# Patient Record
Sex: MALE | Race: WHITE | NOT HISPANIC OR LATINO | Employment: FULL TIME | URBAN - METROPOLITAN AREA
[De-identification: names, ages, dates, MRNs, and addresses within clinical notes are randomized per-mention and may not be internally consistent; named-entity substitution may affect disease eponyms.]

---

## 2012-10-31 LAB
CHOLEST SERPL-MCNC: 196 MG/DL
HDLC SERPL-MCNC: 35 MG/DL
LDLC SERPL CALC-MCNC: 140 MG/DL
TRIGL SERPL-MCNC: 105 MG/DL

## 2012-11-01 LAB — HBA1C MFR BLD: 5.7 % (ref 4.2–6.1)

## 2017-01-20 ENCOUNTER — OFFICE VISIT - HEALTHEAST (OUTPATIENT)
Dept: FAMILY MEDICINE | Facility: CLINIC | Age: 52
End: 2017-01-20

## 2017-01-20 DIAGNOSIS — I25.10 CORONARY ARTERY DISEASE: ICD-10-CM

## 2017-01-20 DIAGNOSIS — E78.2 MIXED HYPERLIPIDEMIA: ICD-10-CM

## 2017-01-20 DIAGNOSIS — I10 UNSPECIFIED ESSENTIAL HYPERTENSION: ICD-10-CM

## 2017-01-20 DIAGNOSIS — H10.9 CONJUNCTIVITIS: ICD-10-CM

## 2017-01-20 LAB
CHOLEST SERPL-MCNC: 100 MG/DL
FASTING STATUS PATIENT QL REPORTED: YES
HDLC SERPL-MCNC: 33 MG/DL
LDLC SERPL CALC-MCNC: 55 MG/DL
TRIGL SERPL-MCNC: 58 MG/DL

## 2017-01-20 RX ORDER — NITROGLYCERIN 0.4 MG/1
TABLET SUBLINGUAL
Qty: 25 TABLET | Refills: 1 | Status: SHIPPED
Start: 2017-01-20 | End: 2024-07-02

## 2017-01-20 ASSESSMENT — MIFFLIN-ST. JEOR: SCORE: 1812.15

## 2017-01-25 ENCOUNTER — COMMUNICATION - HEALTHEAST (OUTPATIENT)
Dept: FAMILY MEDICINE | Facility: CLINIC | Age: 52
End: 2017-01-25

## 2017-08-02 ENCOUNTER — COMMUNICATION - HEALTHEAST (OUTPATIENT)
Dept: FAMILY MEDICINE | Facility: CLINIC | Age: 52
End: 2017-08-02

## 2017-08-02 DIAGNOSIS — I10 ESSENTIAL HYPERTENSION WITH GOAL BLOOD PRESSURE LESS THAN 140/90: ICD-10-CM

## 2017-08-18 ENCOUNTER — COMMUNICATION - HEALTHEAST (OUTPATIENT)
Dept: ADMINISTRATIVE | Facility: CLINIC | Age: 52
End: 2017-08-18

## 2017-11-09 ENCOUNTER — OFFICE VISIT - HEALTHEAST (OUTPATIENT)
Dept: FAMILY MEDICINE | Facility: CLINIC | Age: 52
End: 2017-11-09

## 2017-11-09 ENCOUNTER — COMMUNICATION - HEALTHEAST (OUTPATIENT)
Dept: TELEHEALTH | Facility: CLINIC | Age: 52
End: 2017-11-09

## 2017-11-09 ENCOUNTER — COMMUNICATION - HEALTHEAST (OUTPATIENT)
Dept: FAMILY MEDICINE | Facility: CLINIC | Age: 52
End: 2017-11-09

## 2017-11-09 DIAGNOSIS — E78.2 MIXED HYPERLIPIDEMIA: ICD-10-CM

## 2017-11-09 DIAGNOSIS — I10 ESSENTIAL HYPERTENSION: ICD-10-CM

## 2017-11-09 DIAGNOSIS — F39 MOOD DISORDER (H): ICD-10-CM

## 2017-11-09 DIAGNOSIS — L91.8 SKIN TAG: ICD-10-CM

## 2017-11-09 DIAGNOSIS — I25.810 ATHEROSCLEROSIS OF CORONARY ARTERY BYPASS GRAFT OF NATIVE HEART WITHOUT ANGINA PECTORIS: ICD-10-CM

## 2017-11-09 DIAGNOSIS — I10 ESSENTIAL HYPERTENSION WITH GOAL BLOOD PRESSURE LESS THAN 140/90: ICD-10-CM

## 2017-11-09 ASSESSMENT — MIFFLIN-ST. JEOR: SCORE: 1821.22

## 2017-11-21 ENCOUNTER — OFFICE VISIT - HEALTHEAST (OUTPATIENT)
Dept: CARDIOLOGY | Facility: CLINIC | Age: 52
End: 2017-11-21

## 2017-11-21 DIAGNOSIS — E78.2 MIXED HYPERLIPIDEMIA: ICD-10-CM

## 2017-11-21 DIAGNOSIS — I10 ESSENTIAL HYPERTENSION: ICD-10-CM

## 2017-11-21 DIAGNOSIS — I25.810 CORONARY ARTERY DISEASE INVOLVING CORONARY BYPASS GRAFT OF NATIVE HEART WITHOUT ANGINA PECTORIS: ICD-10-CM

## 2017-11-21 ASSESSMENT — MIFFLIN-ST. JEOR: SCORE: 1834.83

## 2017-12-05 ENCOUNTER — HOSPITAL ENCOUNTER (OUTPATIENT)
Dept: CARDIOLOGY | Facility: HOSPITAL | Age: 52
Discharge: HOME | End: 2017-12-05
Attending: INTERNAL MEDICINE

## 2017-12-05 DIAGNOSIS — I25.810 CORONARY ARTERY DISEASE INVOLVING CORONARY BYPASS GRAFT OF NATIVE HEART WITHOUT ANGINA PECTORIS: ICD-10-CM

## 2017-12-05 LAB
AORTIC ROOT: 3 CM
BSA FOR ECHO PROCEDURE: 2.22 M2
CV ECHO HEIGHT: 71.5 IN
CV ECHO WEIGHT: 216 LBS
DOP CALC LVOT AREA: 3.14 CM2
DOP CALC LVOT DIAMETER: 2 CM
DOP CALC LVOT PEAK VEL: 102 CM/S
DOP CALC LVOT STROKE VOLUME: 68.5 CM3
DOP CALCLVOT PEAK VEL VTI: 21.8 CM
EJECTION FRACTION: 69 % (ref 55–75)
FRACTIONAL SHORTENING: 38.2 % (ref 28–44)
INTERVENTRICULAR SEPTUM IN END DIASTOLE: 0.96 CM (ref 0.6–1)
IVS/PW RATIO: 0.8
LA AREA 1: 18.3 CM2
LA AREA 2: 19 CM2
LEFT ATRIUM LENGTH: 4.9 CM
LEFT ATRIUM SIZE: 5.7 CM
LEFT ATRIUM TO AORTIC ROOT RATIO: 1.9 NO UNITS
LEFT ATRIUM VOLUME INDEX: 27.2 ML/M2
LEFT ATRIUM VOLUME: 60.3 CM3
LEFT VENTRICLE CARDIAC INDEX: 1.9 L/MIN/M2
LEFT VENTRICLE CARDIAC OUTPUT: 4.1 L/MIN
LEFT VENTRICLE DIASTOLIC VOLUME INDEX: 48.2 CM3/M2 (ref 34–74)
LEFT VENTRICLE DIASTOLIC VOLUME: 107 CM3 (ref 62–150)
LEFT VENTRICLE HEART RATE: 60 BPM
LEFT VENTRICLE MASS INDEX: 110.4 G/M2
LEFT VENTRICLE SYSTOLIC VOLUME INDEX: 14.9 CM3/M2 (ref 11–31)
LEFT VENTRICLE SYSTOLIC VOLUME: 33 CM3 (ref 21–61)
LEFT VENTRICULAR INTERNAL DIMENSION IN DIASTOLE: 5.78 CM (ref 4.2–5.8)
LEFT VENTRICULAR INTERNAL DIMENSION IN SYSTOLE: 3.57 CM (ref 2.5–4)
LEFT VENTRICULAR MASS: 245 G
LEFT VENTRICULAR OUTFLOW TRACT MEAN GRADIENT: 2 MMHG
LEFT VENTRICULAR OUTFLOW TRACT MEAN VELOCITY: 64.1 CM/S
LEFT VENTRICULAR OUTFLOW TRACT PEAK GRADIENT: 4 MMHG
LEFT VENTRICULAR POSTERIOR WALL IN END DIASTOLE: 1.13 CM (ref 0.6–1)
LV STROKE VOLUME INDEX: 30.8 ML/M2
MITRAL VALVE E/A RATIO: 1.3
MV AVERAGE E/E' RATIO: 10.1 CM/S
MV DECELERATION TIME: 197 MS
MV E'TISSUE VEL-LAT: 11.7 CM/S
MV E'TISSUE VEL-MED: 8.22 CM/S
MV LATERAL E/E' RATIO: 8.6
MV MEDIAL E/E' RATIO: 12.3
MV PEAK A VELOCITY: 78.6 CM/S
MV PEAK E VELOCITY: 101 CM/S
NUC REST DIASTOLIC VOLUME INDEX: 3456 LBS
NUC REST SYSTOLIC VOLUME INDEX: 71.5 IN
TRICUSPID REGURGITATION PEAK PRESSURE GRADIENT: 29.8 MMHG
TRICUSPID VALVE ANULAR PLANE SYSTOLIC EXCURSION: 1.9 CM
TRICUSPID VALVE PEAK REGURGITANT VELOCITY: 273 CM/S

## 2017-12-05 ASSESSMENT — MIFFLIN-ST. JEOR: SCORE: 1834.83

## 2018-01-24 ENCOUNTER — COMMUNICATION - HEALTHEAST (OUTPATIENT)
Dept: FAMILY MEDICINE | Facility: CLINIC | Age: 53
End: 2018-01-24

## 2018-01-24 DIAGNOSIS — E78.2 MIXED HYPERLIPIDEMIA: ICD-10-CM

## 2018-08-06 ENCOUNTER — COMMUNICATION - HEALTHEAST (OUTPATIENT)
Dept: FAMILY MEDICINE | Facility: CLINIC | Age: 53
End: 2018-08-06

## 2018-08-06 DIAGNOSIS — I10 ESSENTIAL HYPERTENSION WITH GOAL BLOOD PRESSURE LESS THAN 140/90: ICD-10-CM

## 2018-11-06 ENCOUNTER — COMMUNICATION - HEALTHEAST (OUTPATIENT)
Dept: FAMILY MEDICINE | Facility: CLINIC | Age: 53
End: 2018-11-06

## 2018-11-06 DIAGNOSIS — E78.2 MIXED HYPERLIPIDEMIA: ICD-10-CM

## 2018-11-06 DIAGNOSIS — I10 ESSENTIAL HYPERTENSION WITH GOAL BLOOD PRESSURE LESS THAN 140/90: ICD-10-CM

## 2018-11-16 ENCOUNTER — OFFICE VISIT - HEALTHEAST (OUTPATIENT)
Dept: CARDIOLOGY | Facility: CLINIC | Age: 53
End: 2018-11-16

## 2018-11-16 DIAGNOSIS — E78.2 MIXED HYPERLIPIDEMIA: ICD-10-CM

## 2018-11-16 DIAGNOSIS — I10 ESSENTIAL HYPERTENSION: ICD-10-CM

## 2018-11-16 DIAGNOSIS — I25.810 CORONARY ARTERY DISEASE INVOLVING CORONARY BYPASS GRAFT OF NATIVE HEART WITHOUT ANGINA PECTORIS: ICD-10-CM

## 2018-11-16 ASSESSMENT — MIFFLIN-ST. JEOR: SCORE: 1839.37

## 2019-01-28 ENCOUNTER — COMMUNICATION - HEALTHEAST (OUTPATIENT)
Dept: FAMILY MEDICINE | Facility: CLINIC | Age: 54
End: 2019-01-28

## 2019-01-28 DIAGNOSIS — E78.2 MIXED HYPERLIPIDEMIA: ICD-10-CM

## 2019-01-28 DIAGNOSIS — I10 ESSENTIAL HYPERTENSION WITH GOAL BLOOD PRESSURE LESS THAN 140/90: ICD-10-CM

## 2019-03-29 ENCOUNTER — OFFICE VISIT - HEALTHEAST (OUTPATIENT)
Dept: FAMILY MEDICINE | Facility: CLINIC | Age: 54
End: 2019-03-29

## 2019-03-29 DIAGNOSIS — Z00.00 ROUTINE GENERAL MEDICAL EXAMINATION AT A HEALTH CARE FACILITY: ICD-10-CM

## 2019-03-29 DIAGNOSIS — M79.671 BILATERAL FOOT PAIN: ICD-10-CM

## 2019-03-29 DIAGNOSIS — M79.672 BILATERAL FOOT PAIN: ICD-10-CM

## 2019-03-29 DIAGNOSIS — I10 ESSENTIAL HYPERTENSION: ICD-10-CM

## 2019-03-29 DIAGNOSIS — D12.2 ADENOMATOUS POLYP OF ASCENDING COLON: ICD-10-CM

## 2019-03-29 DIAGNOSIS — I25.810 ATHEROSCLEROSIS OF CORONARY ARTERY BYPASS GRAFT OF NATIVE HEART WITHOUT ANGINA PECTORIS: ICD-10-CM

## 2019-03-29 DIAGNOSIS — G89.29 CHRONIC LEFT SHOULDER PAIN: ICD-10-CM

## 2019-03-29 DIAGNOSIS — R73.01 IMPAIRED FASTING GLUCOSE: ICD-10-CM

## 2019-03-29 DIAGNOSIS — E78.2 MIXED HYPERLIPIDEMIA: ICD-10-CM

## 2019-03-29 DIAGNOSIS — M25.512 CHRONIC LEFT SHOULDER PAIN: ICD-10-CM

## 2019-03-29 LAB
ALBUMIN SERPL-MCNC: 4.1 G/DL (ref 3.5–5)
ALP SERPL-CCNC: 36 U/L (ref 45–120)
ALT SERPL W P-5'-P-CCNC: 26 U/L (ref 0–45)
ANION GAP SERPL CALCULATED.3IONS-SCNC: 9 MMOL/L (ref 5–18)
AST SERPL W P-5'-P-CCNC: 22 U/L (ref 0–40)
BILIRUB DIRECT SERPL-MCNC: 0.2 MG/DL
BILIRUB SERPL-MCNC: 0.4 MG/DL (ref 0–1)
BUN SERPL-MCNC: 8 MG/DL (ref 8–22)
CALCIUM SERPL-MCNC: 9.5 MG/DL (ref 8.5–10.5)
CHLORIDE BLD-SCNC: 105 MMOL/L (ref 98–107)
CHOLEST SERPL-MCNC: 112 MG/DL
CO2 SERPL-SCNC: 29 MMOL/L (ref 22–31)
CREAT SERPL-MCNC: 0.79 MG/DL (ref 0.7–1.3)
ERYTHROCYTE [DISTWIDTH] IN BLOOD BY AUTOMATED COUNT: 12.9 % (ref 11–14.5)
FASTING STATUS PATIENT QL REPORTED: YES
GFR SERPL CREATININE-BSD FRML MDRD: >60 ML/MIN/1.73M2
GLUCOSE BLD-MCNC: 103 MG/DL (ref 70–125)
HCT VFR BLD AUTO: 41.3 % (ref 40–54)
HDLC SERPL-MCNC: 42 MG/DL
HGB BLD-MCNC: 14.2 G/DL (ref 14–18)
LDLC SERPL CALC-MCNC: 59 MG/DL
MCH RBC QN AUTO: 30.4 PG (ref 27–34)
MCHC RBC AUTO-ENTMCNC: 34.4 G/DL (ref 32–36)
MCV RBC AUTO: 88 FL (ref 80–100)
PLATELET # BLD AUTO: 169 THOU/UL (ref 140–440)
PMV BLD AUTO: 7.7 FL (ref 7–10)
POTASSIUM BLD-SCNC: 4.1 MMOL/L (ref 3.5–5)
PROT SERPL-MCNC: 6.8 G/DL (ref 6–8)
PSA SERPL-MCNC: 0.4 NG/ML (ref 0–3.5)
RBC # BLD AUTO: 4.67 MILL/UL (ref 4.4–6.2)
SODIUM SERPL-SCNC: 143 MMOL/L (ref 136–145)
TRIGL SERPL-MCNC: 57 MG/DL
WBC: 4.7 THOU/UL (ref 4–11)

## 2019-03-29 ASSESSMENT — MIFFLIN-ST. JEOR: SCORE: 1822.48

## 2019-05-06 ENCOUNTER — COMMUNICATION - HEALTHEAST (OUTPATIENT)
Dept: FAMILY MEDICINE | Facility: CLINIC | Age: 54
End: 2019-05-06

## 2019-05-06 DIAGNOSIS — I10 ESSENTIAL HYPERTENSION WITH GOAL BLOOD PRESSURE LESS THAN 140/90: ICD-10-CM

## 2019-07-10 ENCOUNTER — COMMUNICATION - HEALTHEAST (OUTPATIENT)
Dept: FAMILY MEDICINE | Facility: CLINIC | Age: 54
End: 2019-07-10

## 2019-08-10 ENCOUNTER — COMMUNICATION - HEALTHEAST (OUTPATIENT)
Dept: FAMILY MEDICINE | Facility: CLINIC | Age: 54
End: 2019-08-10

## 2019-08-10 DIAGNOSIS — E78.2 MIXED HYPERLIPIDEMIA: ICD-10-CM

## 2019-11-12 ENCOUNTER — COMMUNICATION - HEALTHEAST (OUTPATIENT)
Dept: FAMILY MEDICINE | Facility: CLINIC | Age: 54
End: 2019-11-12

## 2019-11-12 DIAGNOSIS — E78.2 MIXED HYPERLIPIDEMIA: ICD-10-CM

## 2020-02-09 ENCOUNTER — COMMUNICATION - HEALTHEAST (OUTPATIENT)
Dept: FAMILY MEDICINE | Facility: CLINIC | Age: 55
End: 2020-02-09

## 2020-02-09 DIAGNOSIS — E78.2 MIXED HYPERLIPIDEMIA: ICD-10-CM

## 2020-03-13 ENCOUNTER — OFFICE VISIT - HEALTHEAST (OUTPATIENT)
Dept: FAMILY MEDICINE | Facility: CLINIC | Age: 55
End: 2020-03-13

## 2020-03-13 DIAGNOSIS — L03.116 CELLULITIS OF LEFT LOWER EXTREMITY: ICD-10-CM

## 2020-05-09 ENCOUNTER — COMMUNICATION - HEALTHEAST (OUTPATIENT)
Dept: FAMILY MEDICINE | Facility: CLINIC | Age: 55
End: 2020-05-09

## 2020-05-09 DIAGNOSIS — E78.2 MIXED HYPERLIPIDEMIA: ICD-10-CM

## 2020-05-15 ENCOUNTER — COMMUNICATION - HEALTHEAST (OUTPATIENT)
Dept: FAMILY MEDICINE | Facility: CLINIC | Age: 55
End: 2020-05-15

## 2020-05-15 DIAGNOSIS — I10 ESSENTIAL HYPERTENSION WITH GOAL BLOOD PRESSURE LESS THAN 140/90: ICD-10-CM

## 2020-09-15 ENCOUNTER — COMMUNICATION - HEALTHEAST (OUTPATIENT)
Dept: CARDIOLOGY | Facility: CLINIC | Age: 55
End: 2020-09-15

## 2020-09-16 ENCOUNTER — OFFICE VISIT - HEALTHEAST (OUTPATIENT)
Dept: CARDIOLOGY | Facility: CLINIC | Age: 55
End: 2020-09-16

## 2020-09-16 DIAGNOSIS — I25.810 ATHEROSCLEROSIS OF CORONARY ARTERY BYPASS GRAFT OF NATIVE HEART WITHOUT ANGINA PECTORIS: ICD-10-CM

## 2020-09-16 DIAGNOSIS — I10 ESSENTIAL HYPERTENSION: ICD-10-CM

## 2020-09-16 DIAGNOSIS — E78.2 MIXED HYPERLIPIDEMIA: ICD-10-CM

## 2020-09-16 ASSESSMENT — MIFFLIN-ST. JEOR: SCORE: 1773.03

## 2021-05-27 NOTE — PATIENT INSTRUCTIONS - HE
Increase physical activity. Your goal is 30 minutes most days  Consider the Shingrix vaccine for shingles.  Check with insurance regarding coverage  Your colonoscopy will be due next year in March 2020  We will check laboratory testing including a prostate cancer screening test  Review the stretches and exercises for your shoulder discomfort  If you have ongoing shoulder pain then I recommend follow-up with orthopedics  Make sure that you have appropriate foot wear  You can follow-up with podiatry if having ongoing issues  You may call for the referrals if needed

## 2021-05-27 NOTE — PROGRESS NOTES
Assessment/ Plan     1. Routine general medical examination at a health care facility    Recommend that he increase physical activity.  His goal is 30 minutes of aerobic exercise most days  Check a PSA.  Discussed the caveats of prostate cancer screening  - PSA (Prostatic-Specific Antigen), Annual Screen    Recommend he consider the Shingrix/shingles vaccine    2. Atherosclerosis of coronary artery bypass graft of native heart without angina pectoris  Status post CABG x5    Continue to follow plan of Dr. Ta, cardiology  Check a lipid cascade, hepatic profile, basic metabolic panel  Recommend increasing aerobic exercise as tolerated  Continue aspirin, atorvastatin, metoprolol  - HM2(CBC w/o Differential)    3. Hypertension, currently stable    Continue metoprolol  Check a basic metabolic panel  - Basic Metabolic Panel    4. Mixed hyperlipidemia    Continue atorvastatin  Check lipid cascade and hepatic profile  - Hepatic Profile  - Lipid Cascade    5. Impaired fasting glucose    Recommend limiting carbohydrates in his diet  Check a glucose level    6. Chronic left shoulder pain  May be secondary to a mild rotator cuff tendinitis    Reviewed stretches and exercises  Patient may take anti-inflammatory medication as tolerated  If not improving then favor follow-up with orthopedics    7. Bilateral foot pain  He appears to have tenderness of the mid fifth metatarsals bilaterally at the tendon insertion point    Recommend that he make sure that his shoes have appropriate with for the toe box is  If not improving then favor follow-up with orthopedics    8.  Adenomatous colon polyp    Reviewed his colonoscopy from March of 2015.  He is due in March 2020      Subjective:       Toney Bro is a 53 y.o. male who presents to the clinic for a complete physical examination.  He is overdue for a visit and his last laboratory testing was in 2017.    As noted, he has a history of coronary artery disease, hypertension,  hyperlipidemia, and a mood disorder.  He has had previous 5 vessel bypass surgery in 2012 and continues to follow-up with cardiology, Dr. Ta.  He has been stable from a cardiovascular perspective and continues to take aspirin, atorvastatin, and metoprolol.  He tolerates his medications.    His last colonoscopy was in March 2015.  This showed a tubular adenoma and he will be due again in March 2020.    He does have a few concerns today.  First, he would like to get his ears checked.  Second, he has had chronic left shoulder pain.  He states that he has pain when trying to elevate his left arm.  This can be quite uncomfortable.  He sometimes cannot sleep on his left side.  He cannot think of any specific injury or repetitive activities.  Also, he has had bilateral foot pain.  He has pain along the lateral aspect of both of his feet.  He denies obvious swelling or redness.    He admits he has not gotten regular aerobic exercise.  He essentially has not exercised since October 2018.  Review of systems is negative for headache, dizziness, chest pain, palpitations, bowel changes, or any urinary concerns.  He would like his ears checked for wax.    Finally, he reports that his mood has been stable.  He actually never started fluoxetine.  He states his mood has not really changed.  He does have some depressive symptoms but this has not changed.  He does not wish to start medication at this time.    The following portions of the patient's history were reviewed and updated as appropriate: allergies, current medications, past family history, past medical history, past social history, past surgical history and problem list. Medications have been reconciled    Review of Systems   A 12 point comprehensive review of systems was negative except as noted.      Current Outpatient Medications   Medication Sig Dispense Refill     ASCORBATE CALCIUM (VITAMIN C ORAL) Take by mouth. 500mg daily       aspirin 81 MG EC tablet Take 81 mg by  "mouth daily.       atorvastatin (LIPITOR) 80 MG tablet TAKE 1 TABLET(80 MG) BY MOUTH AT BEDTIME 90 tablet 1     cholecalciferol, vitamin D3, (VITAMIN D3) 1,000 unit capsule Take 500 Units by mouth daily.        IBUPROFEN ORAL Take by mouth as needed.        metoprolol succinate (TOPROL-XL) 25 MG TAKE 1 TABLET(25 MG) BY MOUTH DAILY 90 tablet 0     MULTIVIT &MINERALS/FERROUS FUM (MULTI VITAMIN ORAL) Take by mouth.       FLUoxetine (PROZAC) 10 MG capsule Take 1 capsule (10 mg total) by mouth daily. 30 capsule 2     nitroglycerin (NITROSTAT) 0.4 MG SL tablet Take one PO Q 5 minutes prn chest x 3 prn 25 tablet 1     No current facility-administered medications for this visit.        Objective:      /70   Pulse 60   Ht 5' 10.75\" (1.797 m)   Wt 215 lb 14.4 oz (97.9 kg)   BMI 30.33 kg/m        General appearance: alert, appears stated age and cooperative  Head: Normocephalic, without obvious abnormality, atraumatic  Eyes: conjunctivae/corneas clear. PERRL, EOM's intact.   Ears: normal TM's and external ear canals both ears  There is a moderate amount of cerumen in external auditory canals that was removed via curette  Nose: Nares normal. Septum midline. Mucosa normal. No drainage or sinus tenderness.  Throat: lips, mucosa, and tongue normal; teeth and gums normal  Neck: no adenopathy, supple, symmetrical, trachea midline and thyroid not enlarged  Back: symmetric, no curvature. ROM normal. No CVA tenderness.  Lungs: clear to auscultation bilaterally  Heart: regular rate and rhythm, S1, S2 normal, no murmur, click, rub or gallop  Abdomen: soft, non-tender; bowel sounds normal; no masses,  no organomegaly  Genitourinary: Penis is circumcised, no scrotal masses, no inguinal hernia  Rectal: Normal sphincter tone, prostate smooth and symmetric  Extremities: extremities normal, atraumatic, no cyanosis or edema  Examination of left shoulder reveals normal range of motion  There is minimal discomfort with left arm " abduction against resistance  There are no obvious impingement signs  Examination of the feet bilaterally reveals some tenderness to palpation in the mid fifth metatarsal area bilaterally  Skin: Skin color, texture, turgor normal. No rashes or lesions  Lymph nodes: Cervical nodes normal.  Neurologic: Alert and oriented X 3         No results found for this or any previous visit (from the past 168 hour(s)).       This note has been dictated using voice recognition software. Any grammatical or context distortions are unintentional and inherent to the software

## 2021-05-28 NOTE — TELEPHONE ENCOUNTER
Refill Approved    Rx renewed per Medication Renewal Policy. Medication was last renewed on 1/30/19.    Jen Galvez, [facility] Triage/Med Refill 5/6/2019     Requested Prescriptions   Pending Prescriptions Disp Refills     metoprolol succinate (TOPROL-XL) 25 MG [Pharmacy Med Name: METOPROLOL ER SUCCINATE 25MG TABS] 90 tablet 0     Sig: TAKE 1 TABLET(25 MG) BY MOUTH DAILY       Beta-Blockers Refill Protocol Passed - 5/6/2019  2:43 PM        Passed - PCP or prescribing provider visit in past 12 months or next 3 months     Last office visit with prescriber/PCP: 11/9/2017 Marshall Khalil MD OR same dept: Visit date not found OR same specialty: 11/9/2017 Marshall Khalil MD  Last physical: 3/29/2019 Last MTM visit: Visit date not found   Next visit within 3 mo: Visit date not found  Next physical within 3 mo: Visit date not found  Prescriber OR PCP: Marshall Khalil MD  Last diagnosis associated with med order: 1. Essential hypertension with goal blood pressure less than 140/90  - metoprolol succinate (TOPROL-XL) 25 MG [Pharmacy Med Name: METOPROLOL ER SUCCINATE 25MG TABS]; TAKE 1 TABLET(25 MG) BY MOUTH DAILY  Dispense: 90 tablet; Refill: 0    If protocol passes may refill for 12 months if within 3 months of last provider visit (or a total of 15 months).             Passed - Blood pressure filed in past 12 months     BP Readings from Last 1 Encounters:   03/29/19 118/70

## 2021-05-30 VITALS — HEIGHT: 72 IN | BODY MASS INDEX: 28.58 KG/M2 | WEIGHT: 211 LBS

## 2021-05-31 VITALS — HEIGHT: 72 IN | BODY MASS INDEX: 29.26 KG/M2 | WEIGHT: 216 LBS

## 2021-05-31 VITALS — HEIGHT: 72 IN | WEIGHT: 213 LBS | BODY MASS INDEX: 28.85 KG/M2

## 2021-05-31 VITALS — WEIGHT: 216 LBS | BODY MASS INDEX: 29.26 KG/M2 | HEIGHT: 72 IN

## 2021-05-31 NOTE — TELEPHONE ENCOUNTER
Refill Approved    Rx renewed per Medication Renewal Policy. Medication was last renewed on 1/30/19  OV 3/29/19.    Shaneka Woodward, [facility] Connection Triage/Med Refill 8/10/2019     Requested Prescriptions   Pending Prescriptions Disp Refills     atorvastatin (LIPITOR) 80 MG tablet [Pharmacy Med Name: ATORVASTATIN 80MG TABLETS] 90 tablet 0     Sig: TAKE 1 TABLET(80 MG) BY MOUTH AT BEDTIME       Statins Refill Protocol (Hmg CoA Reductase Inhibitors) Passed - 8/10/2019  8:28 PM        Passed - PCP or prescribing provider visit in past 12 months      Last office visit with prescriber/PCP: 11/9/2017 Marshall Khalil MD OR same dept: Visit date not found OR same specialty: 11/9/2017 Marsahll Khalil MD  Last physical: 3/29/2019 Last MTM visit: Visit date not found   Next visit within 3 mo: Visit date not found  Next physical within 3 mo: Visit date not found  Prescriber OR PCP: Marshall Khalil MD  Last diagnosis associated with med order: 1. Mixed hyperlipidemia  - atorvastatin (LIPITOR) 80 MG tablet [Pharmacy Med Name: ATORVASTATIN 80MG TABLETS]; TAKE 1 TABLET(80 MG) BY MOUTH AT BEDTIME  Dispense: 90 tablet; Refill: 0    If protocol passes may refill for 12 months if within 3 months of last provider visit (or a total of 15 months).

## 2021-06-02 VITALS — HEIGHT: 71 IN | WEIGHT: 215.9 LBS | BODY MASS INDEX: 30.23 KG/M2

## 2021-06-02 VITALS — WEIGHT: 217 LBS | HEIGHT: 72 IN | BODY MASS INDEX: 29.39 KG/M2

## 2021-06-03 NOTE — TELEPHONE ENCOUNTER
Refill Approved    Rx renewed per Medication Renewal Policy. Medication was last renewed on 8/10/19.    Shaneka Woodward, [facility] [place] Connection Triage/Med Refill 11/12/2019     Requested Prescriptions   Pending Prescriptions Disp Refills     atorvastatin (LIPITOR) 80 MG tablet [Pharmacy Med Name: ATORVASTATIN 80MG TABLETS] 90 tablet 0     Sig: TAKE 1 TABLET(80 MG) BY MOUTH AT BEDTIME       Statins Refill Protocol (Hmg CoA Reductase Inhibitors) Passed - 11/12/2019  9:21 AM        Passed - PCP or prescribing provider visit in past 12 months      Last office visit with prescriber/PCP: 11/9/2017 Marshall Khalil MD OR same dept: Visit date not found OR same specialty: 11/9/2017 Marshall Khalil MD  Last physical: 3/29/2019 Last MTM visit: Visit date not found   Next visit within 3 mo: Visit date not found  Next physical within 3 mo: Visit date not found  Prescriber OR PCP: Marshall Khalil MD  Last diagnosis associated with med order: 1. Mixed hyperlipidemia  - atorvastatin (LIPITOR) 80 MG tablet [Pharmacy Med Name: ATORVASTATIN 80MG TABLETS]; TAKE 1 TABLET(80 MG) BY MOUTH AT BEDTIME  Dispense: 90 tablet; Refill: 0    If protocol passes may refill for 12 months if within 3 months of last provider visit (or a total of 15 months).

## 2021-06-04 ENCOUNTER — RECORDS - HEALTHEAST (OUTPATIENT)
Dept: ADMINISTRATIVE | Facility: CLINIC | Age: 56
End: 2021-06-04

## 2021-06-04 VITALS
SYSTOLIC BLOOD PRESSURE: 140 MMHG | HEIGHT: 71 IN | RESPIRATION RATE: 16 BRPM | DIASTOLIC BLOOD PRESSURE: 86 MMHG | BODY MASS INDEX: 28.7 KG/M2 | HEART RATE: 56 BPM | WEIGHT: 205 LBS

## 2021-06-04 VITALS
BODY MASS INDEX: 29.78 KG/M2 | RESPIRATION RATE: 12 BRPM | SYSTOLIC BLOOD PRESSURE: 128 MMHG | TEMPERATURE: 98.3 F | DIASTOLIC BLOOD PRESSURE: 88 MMHG | WEIGHT: 212 LBS | HEART RATE: 60 BPM

## 2021-06-06 NOTE — TELEPHONE ENCOUNTER
Refill Approved    Rx renewed per Medication Renewal Policy. Medication was last renewed on 11/12/19.    Ov: 3/29/19    Talya Mccord, [facility] Triage/Med Refill 2/14/2020     Requested Prescriptions   Pending Prescriptions Disp Refills     atorvastatin (LIPITOR) 80 MG tablet [Pharmacy Med Name: ATORVASTATIN 80MG TABLETS] 90 tablet 0     Sig: TAKE 1 TABLET(80 MG) BY MOUTH AT BEDTIME       Statins Refill Protocol (Hmg CoA Reductase Inhibitors) Passed - 2/9/2020 10:58 PM        Passed - PCP or prescribing provider visit in past 12 months      Last office visit with prescriber/PCP: 11/9/2017 Marshall Khalil MD OR same dept: Visit date not found OR same specialty: 11/9/2017 Marshall Khalil MD  Last physical: 3/29/2019 Last MTM visit: Visit date not found   Next visit within 3 mo: Visit date not found  Next physical within 3 mo: Visit date not found  Prescriber OR PCP: Marshall Khalil MD  Last diagnosis associated with med order: 1. Mixed hyperlipidemia  - atorvastatin (LIPITOR) 80 MG tablet [Pharmacy Med Name: ATORVASTATIN 80MG TABLETS]; TAKE 1 TABLET(80 MG) BY MOUTH AT BEDTIME  Dispense: 90 tablet; Refill: 0    If protocol passes may refill for 12 months if within 3 months of last provider visit (or a total of 15 months).

## 2021-06-06 NOTE — PROGRESS NOTES
[facility] Note    Name: Toney Bro  : 1965   MRN: [id number]    Toney Bro is a 54 y.o. male presenting to discuss the following:     CC:   Chief Complaint   Patient presents with     Mass     upper left thigh       HPI:  Rapidly growing spot on left middle thigh, thinks may be a boil. Is painful, not draining anything. No fevers or chills.     ROS:   CONSTITUTIONAL: No fevers, no chills.   DERM: No drainage.     PMH:   Patient Active Problem List   Diagnosis     Insomnia     Psoriasis     Midback Pain     Coronary Artery Disease     Hypertension     Closed Fracture Of The Right Ankle     Mixed hyperlipidemia     Impaired Fasting Glucose     Adenomatous polyp of ascending colon       PSH:   Past Surgical History:   Procedure Laterality Date     [place] ABDOMEN SURGERY PROC UNLISTED      Description: Hernia Repair;  Recorded: 2008;       MEDICATIONS:   Current Outpatient Medications on File Prior to Visit   Medication Sig Dispense Refill     ASCORBATE CALCIUM (VITAMIN C ORAL) Take by mouth. 500mg daily       aspirin 81 MG EC tablet Take 81 mg by mouth daily.       atorvastatin (LIPITOR) 80 MG tablet TAKE 1 TABLET(80 MG) BY MOUTH AT BEDTIME 90 tablet 0     cholecalciferol, vitamin D3, (VITAMIN D3) 1,000 unit capsule Take 500 Units by mouth daily.        IBUPROFEN ORAL Take by mouth as needed.        metoprolol succinate (TOPROL-XL) 25 MG Take 1 tablet (25 mg total) by mouth daily. 90 tablet 3     MULTIVIT &MINERALS/FERROUS FUM (MULTI VITAMIN ORAL) Take by mouth.       nitroglycerin (NITROSTAT) 0.4 MG SL tablet Take one PO Q 5 minutes prn chest x 3 prn 25 tablet 1     No current facility-administered medications on file prior to visit.        ALLERGIES:  Allergies   Allergen Reactions     Lisinopril        PHYSICAL EXAM:   /88   Pulse 60   Temp 98.3  F (36.8  C) (Oral)   Resp 12   Wt 212 lb (96.2 kg)   BMI 29.78 kg/m     GENERAL: Toney is a pleasant, non-toxic appearing male in  no acute distress.   HEART: Regular rate and rhythm, no murmurs.   LUNGS: Clear to auscultation bilaterally, unlabored.   DERM: 2cm erythematous, warm nodule left proximal medial thigh, no fluctuance present.    ASSESSMENT & PLAN:   Toney Bro is a 54 y.o. male presenting today for evaluation of possible leg infection.    1. Cellulitis of left lower extremity  - sulfamethoxazole-trimethoprim (BACTRIM DS) 800-160 mg per tablet; Take 1 tablet by mouth 2 (two) times a day for 10 days.  Dispense: 20 tablet; Refill: 0     Lesion is concerning for cellulitic skin infection vs boil. I do not appreciate any fluctuance so will not attempt I&D today. Will treat with antibiotics. Encouraged warm compresses to facilitate drainage.     If symptoms are worsening, return for further evaluation and possible I&D.     RTC: 1 week - if symptoms are not resolving / sooner as needed    Kaya Allen, DO

## 2021-06-07 ENCOUNTER — RECORDS - HEALTHEAST (OUTPATIENT)
Dept: ADMINISTRATIVE | Facility: OTHER | Age: 56
End: 2021-06-07

## 2021-06-07 DIAGNOSIS — E78.2 MIXED HYPERLIPIDEMIA: ICD-10-CM

## 2021-06-07 DIAGNOSIS — I10 ESSENTIAL HYPERTENSION WITH GOAL BLOOD PRESSURE LESS THAN 140/90: ICD-10-CM

## 2021-06-07 RX ORDER — ATORVASTATIN CALCIUM 80 MG/1
TABLET, FILM COATED ORAL
Qty: 90 TABLET | Refills: 0 | Status: SHIPPED | OUTPATIENT
Start: 2021-06-07 | End: 2021-09-02

## 2021-06-07 RX ORDER — METOPROLOL SUCCINATE 25 MG/1
TABLET, EXTENDED RELEASE ORAL
Qty: 90 TABLET | Refills: 0 | Status: SHIPPED | OUTPATIENT
Start: 2021-06-07 | End: 2022-10-04

## 2021-06-08 NOTE — TELEPHONE ENCOUNTER
Refill Approved    Rx renewed per Medication Renewal Policy. Medication was last renewed on 5/6/19.    Marcy Gao, [facility] Triage/Med Refill 5/18/2020     Requested Prescriptions   Pending Prescriptions Disp Refills     metoprolol succinate (TOPROL-XL) 25 MG [Pharmacy Med Name: METOPROLOL ER SUCCINATE 25MG TABS] 90 tablet 3     Sig: TAKE 1 TABLET(25 MG) BY MOUTH DAILY       Beta-Blockers Refill Protocol Passed - 5/15/2020  4:15 PM        Passed - PCP or prescribing provider visit in past 12 months or next 3 months     Last office visit with prescriber/PCP: 11/9/2017 Marshall Khalil MD OR same dept: 3/13/2020 Kaya Allen DO OR same specialty: 3/13/2020 Kaya Allen DO  Last physical: 3/29/2019 Last MTM visit: Visit date not found   Next visit within 3 mo: Visit date not found  Next physical within 3 mo: Visit date not found  Prescriber OR PCP: Marshall Khalil MD  Last diagnosis associated with med order: 1. Essential hypertension with goal blood pressure less than 140/90  - metoprolol succinate (TOPROL-XL) 25 MG [Pharmacy Med Name: METOPROLOL ER SUCCINATE 25MG TABS]; TAKE 1 TABLET(25 MG) BY MOUTH DAILY  Dispense: 90 tablet; Refill: 3    If protocol passes may refill for 12 months if within 3 months of last provider visit (or a total of 15 months).             Passed - Blood pressure filed in past 12 months     BP Readings from Last 1 Encounters:   03/13/20 128/88

## 2021-06-08 NOTE — PROGRESS NOTES
SUBJECTIVE:    This is a 51-year-old male with a known history of coronary artery disease, hypertension, hyperlipidemia who presents to the clinic for a medication check. He also has had a recent conjunctivitis involving his eyes. Medical history is notable for coronary disease with previous five vessel bypass surgery in 2012. He has generally been doing quite well since that time and has followed up with cardiology previously. He continues to take atorvastatin and atenolol as well as a daily aspirin. He tries to remain active and is not had recent chest pain, shortness of breath, orthopnea, or paroxysmal natural dyspnea. Occasionally, he will have loose stools. He cannot think of specific food triggers. He denies dark and tarry stools or passage of bright red blood per rectum.He did have a colonoscopy in March 2015 that showed internal hemorrhoids but was otherwise normal. He has had a discharge involving both eyes. He has had some redness. He significant nasal congestion, fever, ear pain, or sore throat.        Patient Active Problem List   Diagnosis     Limb Pain     Insomnia     Psoriasis     Midback Pain     Skin: A Rash     Open Wound     Coronary Artery Disease     Hypertension     Closed Fracture Of The Right Ankle     Mixed hyperlipidemia     Impaired Fasting Glucose       Current Outpatient Prescriptions on File Prior to Visit   Medication Sig     ASCORBATE CALCIUM (VITAMIN C ORAL) Take by mouth. 500mg daily     aspirin 81 MG EC tablet Take 81 mg by mouth daily.     cholecalciferol, vitamin D3, (VITAMIN D3) 1,000 unit capsule Take 500 Units by mouth daily.      IBUPROFEN ORAL Take by mouth as needed.      MULTIVIT &MINERALS/FERROUS FUM (MULTI VITAMIN ORAL) Take by mouth.     No current facility-administered medications on file prior to visit.        Allergies   Allergen Reactions     Lisinopril             A 12 point comprehensive review of systems was negative except as noted.      OBJECTIVE:     Vitals:     01/20/17 0927   BP: 136/76   Pulse: 60   Resp: 16   Temp: 97.8  F (36.6  C)       General: Alert, not acutely distressed   Head:  Atraumatic, normocephalic  Ears:  TMs normal pearly-gray  Eyes:  PERRL, extraocular movements are intact  There is mild scleral injection bilaterally  Nose:  Septum midline  Throat:  Oral mucosa moist, oropharynx clear  Neck:  Supple without adenopathy or thyromegaly   Cardiovascular: S1-S2 with regular rate and without murmur, rub, or gallop   Lungs:  Clear to auscultation bilaterally   Extremities: Without cyanosis or edema   Neuro:  CN II-XII appear intact        Laboratory Results:    Results for orders placed or performed in visit on 01/20/17   Lipid Cascade   Result Value Ref Range    Cholesterol 100 <=199 mg/dL    Triglycerides 58 <=149 mg/dL    HDL Cholesterol 33 (L) >=40 mg/dL    LDL Calculated 55 <=129 mg/dL    Patient Fasting > 8hrs? Yes    Basic Metabolic Panel   Result Value Ref Range    Sodium 140 136 - 145 mmol/L    Potassium 4.4 3.5 - 5.0 mmol/L    Chloride 104 98 - 107 mmol/L    CO2 27 22 - 31 mmol/L    Anion Gap, Calculation 9 5 - 18 mmol/L    Glucose 98 70 - 125 mg/dL    Calcium 9.5 8.5 - 10.5 mg/dL    BUN 12 8 - 22 mg/dL    Creatinine 0.79 0.70 - 1.30 mg/dL    GFR MDRD Af Amer >60 >60 mL/min/1.73m2    GFR MDRD Non Af Amer >60 >60 mL/min/1.73m2   Hepatic Profile   Result Value Ref Range    Bilirubin, Total 0.9 0.0 - 1.0 mg/dL    Bilirubin, Direct 0.3 <=0.5 mg/dL    Protein, Total 6.7 6.0 - 8.0 g/dL    Albumin 4.2 3.5 - 5.0 g/dL    Alkaline Phosphatase 42 (L) 45 - 120 U/L    AST 32 0 - 40 U/L    ALT 27 0 - 45 U/L         ASSESSMENT AND PLAN:    1. Coronary artery disease, currently stable  S/p CABG x 5    Continue atenolol, atorvastatin, and daily aspirin  Refill nitroglycerin  - nitroglycerin (NITROSTAT) 0.4 MG SL tablet; Take one PO Q 5 minutes prn chest x 3 prn  Dispense: 25 tablet; Refill: 1  - Basic Metabolic Panel  Recommend that he remain physically  active  Offered referral back to cardiology if desired    2. Hypertension    Continue atenolol  - atenolol (TENORMIN) 25 MG tablet; TAKE 1 TABLET BY MOUTH DAILY  Dispense: 90 tablet; Refill: 3    3. Mixed hyperlipidemia    Continue atorvastatin  - atorvastatin (LIPITOR) 80 MG tablet; TAKE 1 TABLET BY MOUTH DAILY AT BEDTIME  Dispense: 90 tablet; Refill: 3  - Lipid Cascade  - Hepatic Profile    4. Conjunctivitis    Recommend natural tears  Anticipate resolution  If not improving treat with polytrim eye drops    Patient agrees to plan    25 minutes were spent with the patient and greater than 50% of the time was spent in face to face counseling and coordination of care      Marshall Khalil MD      This note has been dictated and transcribed using voice recognition software.  Any grammatical or contextual distortions are unintentional and inherent to the software.

## 2021-06-08 NOTE — TELEPHONE ENCOUNTER
Refill Approved    Rx renewed per Medication Renewal Policy. Medication was last renewed on 2/14/20.    Marcy Gao, [facility] Triage/Med Refill 5/11/2020     Requested Prescriptions   Pending Prescriptions Disp Refills     atorvastatin (LIPITOR) 80 MG tablet [Pharmacy Med Name: ATORVASTATIN 80MG TABLETS] 90 tablet 0     Sig: TAKE 1 TABLET(80 MG) BY MOUTH AT BEDTIME       Statins Refill Protocol (Hmg CoA Reductase Inhibitors) Passed - 5/9/2020 10:19 PM        Passed - PCP or prescribing provider visit in past 12 months      Last office visit with prescriber/PCP: 11/9/2017 Marshall Khalil MD OR same dept: 3/13/2020 Kaya Allen DO OR same specialty: 3/13/2020 Kaya Allen DO  Last physical: 3/29/2019 Last MTM visit: Visit date not found   Next visit within 3 mo: Visit date not found  Next physical within 3 mo: Visit date not found  Prescriber OR PCP: Marshall Khalil MD  Last diagnosis associated with med order: 1. Mixed hyperlipidemia  - atorvastatin (LIPITOR) 80 MG tablet [Pharmacy Med Name: ATORVASTATIN 80MG TABLETS]; TAKE 1 TABLET(80 MG) BY MOUTH AT BEDTIME  Dispense: 90 tablet; Refill: 0    If protocol passes may refill for 12 months if within 3 months of last provider visit (or a total of 15 months).

## 2021-06-11 NOTE — TELEPHONE ENCOUNTER
Wellness Screening Tool  Symptom Screening:  Do you have one of the following NEW symptoms:    Fever (subjective or >100.0)?  No    A new cough?  No    Shortness of breath?  No     Chills? No     New loss of taste or smell? No     Generalized body aches? No     New persistent headache? No     New sore throat? No     Nausea, vomiting, or diarrhea?  No    Within the past 2 weeks, have you been exposed to someone with a known positive illness below:    COVID-19 (known or suspected)?  No    Chicken pox?  No    Mealses?  No    Pertussis?  No    Patient notified of visitor policy- They may have one person accompany them to their appointment, but they will need to wear a mask and will be screened upon arrival for symptoms: Yes  Pt informed to wear a mask: Yes  Pt notified if they develop any symptoms listed above, prior to their appointment, they are to call the clinic directly at [phone] for further instructions.  Yes  Patient's appointment status: Patient will be seen in clinic as scheduled on 9/16

## 2021-06-14 NOTE — PROGRESS NOTES
Assessment/ Plan     1. Atherosclerosis of coronary artery bypass graft of native heart without angina pectoris  Status post CABG    Follow-up with Dr. Ta as planned  Continue atorvastatin and metoprolol  Continue aspirin    2. Essential hypertension with goal blood pressure less than 140/90  3. Hypertension    Continue metoprolol  Monitor blood pressure  - metoprolol succinate (TOPROL-XL) 25 MG; TAKE 1 TABLET(25 MG) BY MOUTH DAILY  Dispense: 90 tablet; Refill: 1    4. Mixed hyperlipidemia    Continue atorvastatin  Check a lipid cascade and hepatic profile in the near future  His LDL cholesterol was 55 in January, 2017    5. Skin tag    Discussed possible referral for removal    6.  Mood disorder with symptoms of depression    PHQ-9 score is 13  Patient will start fluoxetine.  Discussed potential side effects and time to reach therapeutic effect  Follow-up with an update in 2-4 weeks      Subjective:       Toney Bro is a 52 y.o. male who presents to the clinic for several issues. He has a known history of coronary artery disease, hypertension, hyperlipidemia who presents to the clinic for a medication check. He also has had a recent conjunctivitis involving his eyes. Medical history is notable for coronary disease with previous five vessel bypass surgery in 2012. He has generally been doing quite well since that time and has followed up with cardiology and will be seeing Dr. Ta in the near future.  He has been compliant with his medications including atorvastatin as well as metoprolol.  He would like to get his ears checked for wax.  Also, he has had some skin tags, including under his right eye.  Next, he notes that his mood has been low recently.  He notes that over the past 4-5 weeks he has been experiencing a low mood.  He notes he has little interest or pleasure in doing things nearly every day.  He does feel down and depressed.  His energy level can be low and he has had some appetite changes.  He has not  "had significant anxiety.  He does note that his daughter has been depressed and has required medication in the past and he would like to consider that as an option.  He otherwise has tried to remain physically active.  Denies headache, disease, chest pain, palpitations.    The following portions of the patient's history were reviewed and updated as appropriate: allergies, current medications, past family history, past medical history, past social history, past surgical history and problem list.    Review of Systems   A 12 point comprehensive review of systems was negative except as noted.      Current Outpatient Prescriptions   Medication Sig Dispense Refill     ASCORBATE CALCIUM (VITAMIN C ORAL) Take by mouth. 500mg daily       aspirin 81 MG EC tablet Take 81 mg by mouth daily.       atorvastatin (LIPITOR) 80 MG tablet TAKE 1 TABLET BY MOUTH DAILY AT BEDTIME 90 tablet 3     cholecalciferol, vitamin D3, (VITAMIN D3) 1,000 unit capsule Take 500 Units by mouth daily.        IBUPROFEN ORAL Take by mouth as needed.        metoprolol succinate (TOPROL-XL) 25 MG TAKE 1 TABLET(25 MG) BY MOUTH DAILY 90 tablet 1     MULTIVIT &MINERALS/FERROUS FUM (MULTI VITAMIN ORAL) Take by mouth.       nitroglycerin (NITROSTAT) 0.4 MG SL tablet Take one PO Q 5 minutes prn chest x 3 prn 25 tablet 1     FLUoxetine (PROZAC) 10 MG capsule Take 1 capsule (10 mg total) by mouth daily. 30 capsule 2     No current facility-administered medications for this visit.        Objective:      /78  Pulse 60  Temp 98.4  F (36.9  C) (Oral)   Resp 16  Ht 5' 11.5\" (1.816 m)  Wt 213 lb (96.6 kg)  BMI 29.29 kg/m2      General appearance: alert, appears stated age and cooperative  Head: Normocephalic, without obvious abnormality, atraumatic  Eyes: conjunctivae/corneas clear. PERRL, EOM's intact.   Ears: normal TM's and external ear canals both ears  There is a moderate amount of cerumen in both external auditory canals  Nose: Nares normal. Septum " midline. Mucosa normal. No drainage or sinus tenderness.  Throat: lips, mucosa, and tongue normal; teeth and gums normal  Neck: no adenopathy, supple, symmetrical, trachea midline and thyroid not enlarged, symmetric, no tenderness/mass/nodules  Back: symmetric, no curvature. ROM normal. No CVA tenderness.  Lungs: clear to auscultation bilaterally  Heart: regular rate and rhythm, S1, S2 normal, no murmur, click, rub or gallop  Extremities: extremities normal, atraumatic, no cyanosis or edema  Skin: He has multiple skin tags including some under his right eye  Lymph nodes: Cervical nodes normal.  Neurologic: Alert and oriented X 3. Normal coordination and gait         No results found for this or any previous visit (from the past 168 hour(s)).       This note has been dictated using voice recognition software. Any grammatical or context distortions are unintentional and inherent to the software

## 2021-06-16 PROBLEM — D12.2 ADENOMATOUS POLYP OF ASCENDING COLON: Status: ACTIVE | Noted: 2019-03-29

## 2021-06-19 NOTE — LETTER
Letter by Marshall Khalil MD at      Author: Marshall Khalil MD Service: -- Author Type: --    Filed:  Encounter Date: 7/10/2019 Status: (Other)         Toney Bro  [id number]  [place] [address]             July 10, 2019         Dear Mr. Bro,    Below are the results from your recent visit:    Resulted Orders   Basic Metabolic Panel   Result Value Ref Range    Sodium 143 136 - 145 mmol/L    Potassium 4.1 3.5 - 5.0 mmol/L    Chloride 105 98 - 107 mmol/L    CO2 29 22 - 31 mmol/L    Anion Gap, Calculation 9 5 - 18 mmol/L    Glucose 103 70 - 125 mg/dL    Calcium 9.5 8.5 - 10.5 mg/dL    BUN 8 8 - 22 mg/dL    Creatinine 0.79 0.70 - 1.30 mg/dL    GFR MDRD Af Amer >60 >60 mL/min/1.73m2    GFR MDRD Non Af Amer >60 >60 mL/min/1.73m2    Narrative    Fasting Glucose reference range is 70-99 mg/dL per  American Diabetes Association (ADA) guidelines.   HM2(CBC w/o Differential)   Result Value Ref Range    WBC 4.7 4.0 - 11.0 thou/uL    RBC 4.67 4.40 - 6.20 mill/uL    Hemoglobin 14.2 14.0 - 18.0 g/dL    Hematocrit 41.3 40.0 - 54.0 %    MCV 88 80 - 100 fL    MCH 30.4 27.0 - 34.0 pg    MCHC 34.4 32.0 - 36.0 g/dL    RDW 12.9 11.0 - 14.5 %    Platelets 169 140 - 440 thou/uL    MPV 7.7 7.0 - 10.0 fL   Hepatic Profile   Result Value Ref Range    Bilirubin, Total 0.4 0.0 - 1.0 mg/dL    Bilirubin, Direct 0.2 <=0.5 mg/dL    Protein, Total 6.8 6.0 - 8.0 g/dL    Albumin 4.1 3.5 - 5.0 g/dL    Alkaline Phosphatase 36 (L) 45 - 120 U/L    AST 22 0 - 40 U/L    ALT 26 0 - 45 U/L   Lipid Cascade   Result Value Ref Range    Cholesterol 112 <=199 mg/dL    Triglycerides 57 <=149 mg/dL    HDL Cholesterol 42 >=40 mg/dL    LDL Calculated 59 <=129 mg/dL    Patient Fasting > 8hrs? Yes    PSA (Prostatic-Specific Antigen), Annual Screen   Result Value Ref Range    PSA 0.4 0.0 - 3.5 ng/mL    Narrative    Method is Abbott Prostate-Specific Antigen (PSA)  Standard-WHO 1st International (90:10)       Toney,    Your most  recent laboratory testing looks very good. Your prostate test is normal. Your cholesterol numbers are excellent. Your blood counts are normal.    Your liver and kidney tests look very good.     Please call with questions or contact us using MyChart.    Sincerely,        Electronically signed by Marshall Khalil MD

## 2021-06-23 NOTE — TELEPHONE ENCOUNTER
Please call.  There is a refill request for his medications but I have not seen him since 2017.  He also has not had blood tests since early 2017.  He needs an appointment with me if I am prescribing his medications.  We can do his laboratory testing then.  He did see cardiology at the end of 2018 but is overdue for a visit with me and will get labs as discussed.

## 2021-06-23 NOTE — TELEPHONE ENCOUNTER
RN cannot approve Refill Request    RN can NOT refill this medication PCP messaged that patient is overdue for Labs.       Marcy Gao, [facility] Triage/Med Refill 1/29/2019    Requested Prescriptions   Pending Prescriptions Disp Refills     atorvastatin (LIPITOR) 80 MG tablet [Pharmacy Med Name: ATORVASTATIN 80MG TABLETS] 90 tablet 0     Sig: TAKE 1 TABLET(80 MG) BY MOUTH AT BEDTIME    Statins Refill Protocol (Hmg CoA Reductase Inhibitors) Failed - 1/28/2019 10:14 PM       Failed - PCP or prescribing provider visit in past 12 months     Last office visit with prescriber/PCP: 11/9/2017 Marshall Khalil MD OR same dept: Visit date not found OR same specialty: 11/9/2017 Marshall Khalil MD  Last physical: Visit date not found Last MTM visit: Visit date not found   Next visit within 3 mo: Visit date not found  Next physical within 3 mo: Visit date not found  Prescriber OR PCP: Marshall Khalil MD  Last diagnosis associated with med order: 1. Mixed hyperlipidemia  - atorvastatin (LIPITOR) 80 MG tablet [Pharmacy Med Name: ATORVASTATIN 80MG TABLETS]; TAKE 1 TABLET(80 MG) BY MOUTH AT BEDTIME  Dispense: 90 tablet; Refill: 0    2. Essential hypertension with goal blood pressure less than 140/90  - metoprolol succinate (TOPROL-XL) 25 MG [Pharmacy Med Name: METOPROLOL ER SUCCINATE 25MG TABS]; TAKE 1 TABLET(25 MG) BY MOUTH DAILY  Dispense: 90 tablet; Refill: 0    If protocol passes may refill for 12 months if within 3 months of last provider visit (or a total of 15 months).             metoprolol succinate (TOPROL-XL) 25 MG [Pharmacy Med Name: METOPROLOL ER SUCCINATE 25MG TABS] 90 tablet 0     Sig: TAKE 1 TABLET(25 MG) BY MOUTH DAILY    Beta-Blockers Refill Protocol Failed - 1/28/2019 10:14 PM       Failed - PCP or prescribing provider visit in past 12 months or next 3 months    Last office visit with prescriber/PCP: 11/9/2017 Marshall Khalil MD OR same dept: Visit date not found OR same  specialty: 11/9/2017 Marshall Khalil MD  Last physical: Visit date not found Last MTM visit: Visit date not found   Next visit within 3 mo: Visit date not found  Next physical within 3 mo: Visit date not found  Prescriber OR PCP: Marshall Khalil MD  Last diagnosis associated with med order: 1. Mixed hyperlipidemia  - atorvastatin (LIPITOR) 80 MG tablet [Pharmacy Med Name: ATORVASTATIN 80MG TABLETS]; TAKE 1 TABLET(80 MG) BY MOUTH AT BEDTIME  Dispense: 90 tablet; Refill: 0    2. Essential hypertension with goal blood pressure less than 140/90  - metoprolol succinate (TOPROL-XL) 25 MG [Pharmacy Med Name: METOPROLOL ER SUCCINATE 25MG TABS]; TAKE 1 TABLET(25 MG) BY MOUTH DAILY  Dispense: 90 tablet; Refill: 0    If protocol passes may refill for 12 months if within 3 months of last provider visit (or a total of 15 months).            Passed - Blood pressure filed in past 12 months    BP Readings from Last 1 Encounters:   11/16/18 132/90

## 2021-06-23 NOTE — TELEPHONE ENCOUNTER
Pt was called and message was relayed. Pt is scheduled on 3/28/19 for a physical with Dr. Khalil. Pt plans on fasting for labs. He also asked if Dr. Khalil would now fill his prescriptions.

## 2021-06-25 NOTE — PROGRESS NOTES
Progress Notes by Salo Ta MD at 11/21/2017 10:10 AM     Author: Salo Ta MD Service: -- Author Type: Physician    Filed: 11/21/2017 10:35 AM Encounter Date: 11/21/2017 Status: Signed    : Salo Ta MD (Physician)           Click to link to [facility] NOTE       Assessment/Plan:   A 52-year-old gentleman presents to clinic today for routine cardiology followup.     1. Coronary artery disease. He had a 5-vessel bypass surgery in December of 2012, LIMA to LAD, KAREN to OM1, saphenous vein graft to PDA, D1 and OM2. The patient has no chest pain or shortness of breath. Continue current medications, aspirin, metoprolol, and Lipitor.   The patient did not have cardiac evaluation over last 5 years since his bypass surgery 2012.  Echocardiogram is requested for evaluation of heart function and structure.    2. Hypertension. His blood pressure is controlled well with metoprolol succinate to 25 mg daily.     3. Dyslipidemia: Continue Lipitor 80 mg nightly.  Last LDL was 55.    Thank you for the opportunity to be involved in the care of Toney Bro. If you have any questions, please feel free to contact me.  I will see the patient again in 12 months.     History of Present Illness:   It is my pleasure to see Mr. Toney Bro today at the [facility] for routine cardiology followup. Toney is a 52-year-old gentleman with a medical history of coronary artery disease, status post 5-vessel bypass surgery in 12/2012, essential hypertension and hyperlipidemia.     The patient has been doing quite well over the last year. He denies any chest pain, shortness of breath, palpitations, dizziness, orthopnea, PND or leg swelling.  His blood pressure and heart rate are controlled very well.  His cholesterol was also controlled very well.    Past Medical History:     Patient Active Problem List   Diagnosis   ? Insomnia   ? Psoriasis   ? Midback Pain   ? Coronary Artery  "Disease   ? Hypertension   ? Closed Fracture Of The Right Ankle   ? Mixed hyperlipidemia   ? Impaired Fasting Glucose       Past Surgical History:     Past Surgical History:   Procedure Laterality Date   ? IA ABDOMEN SURGERY PROC UNLISTED      Description: Hernia Repair;  Recorded: 04/03/2008;       Family History:   History reviewed. No pertinent family history.     Social History:    reports that he has never smoked. He does not have any smokeless tobacco history on file.    Review of Systems:   General: WNL  Eyes: WNL  Ears/Nose/Throat: WNL  Lungs: WNL  Heart: WNL  Stomach: WNL  Bladder: WNL  Muscle/Joints: WNL  Skin: WNL  Nervous System: WNL  Mental Health: WNL     Blood: WNL    Meds:     Current Outpatient Prescriptions:   ?  ASCORBATE CALCIUM (VITAMIN C ORAL), Take by mouth. 500mg daily, Disp: , Rfl:   ?  aspirin 81 MG EC tablet, Take 81 mg by mouth daily., Disp: , Rfl:   ?  atorvastatin (LIPITOR) 80 MG tablet, TAKE 1 TABLET BY MOUTH DAILY AT BEDTIME, Disp: 90 tablet, Rfl: 3  ?  cholecalciferol, vitamin D3, (VITAMIN D3) 1,000 unit capsule, Take 500 Units by mouth daily. , Disp: , Rfl:   ?  FLUoxetine (PROZAC) 10 MG capsule, Take 1 capsule (10 mg total) by mouth daily., Disp: 30 capsule, Rfl: 2  ?  IBUPROFEN ORAL, Take by mouth as needed. , Disp: , Rfl:   ?  metoprolol succinate (TOPROL-XL) 25 MG, TAKE 1 TABLET(25 MG) BY MOUTH DAILY, Disp: 90 tablet, Rfl: 1  ?  MULTIVIT &MINERALS/FERROUS FUM (MULTI VITAMIN ORAL), Take by mouth., Disp: , Rfl:   ?  nitroglycerin (NITROSTAT) 0.4 MG SL tablet, Take one PO Q 5 minutes prn chest x 3 prn, Disp: 25 tablet, Rfl: 1    Allergies:   Lisinopril      Objective:      Physical Exam  216 lb (98 kg)  5' 11.5\" (1.816 m)  Body mass index is 29.71 kg/(m^2).  /72 (Patient Site: Left Arm, Patient Position: Sitting, Cuff Size: Adult Large)  Pulse 60  Resp 16  Ht 5' 11.5\" (1.816 m)  Wt 216 lb (98 kg) Comment: with shoes  BMI 29.71 kg/m2    General Appearance:   Awake, Alert, " No acute distress.   HEENT:  Pupil equal and reactive to light. No scleral icterus; the mucous membranes were moist.   Neck: No cervical bruits. No JVD. No thyromegaly.     Chest: The spine was straight. The chest was symmetric.   Lungs:   Respirations unlabored; Lungs are clear to auscultation. No crackles. No wheezing.   Cardiovascular:   Regular rhythm and rate, normal first and second heart sounds with no murmurs. No rubs or gallops.    Abdomen:  Soft. No tenderness. Non-distended. Bowels sounds are present   Extremities: Equal tibial pulses. No leg edema.   Skin: No rashes or ulcers. Warm, Dry.   Musculoskeletal: No tenderness. No deformity.   Neurologic: Mood and affect are appropriate. No focal deficits.           Lab Review   Lab Results   Component Value Date     01/20/2017    K 4.4 01/20/2017     01/20/2017    CO2 27 01/20/2017    BUN 12 01/20/2017    CREATININE 0.79 01/20/2017    CALCIUM 9.5 01/20/2017     Lab Results   Component Value Date    WBC 6.1 07/22/2015    HGB 14.6 07/22/2015    HCT 41.8 07/22/2015    MCV 84 07/22/2015     07/22/2015     Lab Results   Component Value Date    CHOL 100 01/20/2017    CHOL 98 07/22/2015    CHOL 125 02/18/2014     Lab Results   Component Value Date    HDL 33 (L) 01/20/2017    HDL 40 07/22/2015    HDL 36 (L) 02/18/2014     Lab Results   Component Value Date    LDLCALC 55 01/20/2017    LDLCALC 42 07/22/2015    LDLCALC 75 02/18/2014     Lab Results   Component Value Date    TRIG 58 01/20/2017    TRIG 81 07/22/2015    TRIG 72 02/18/2014

## 2021-06-25 NOTE — TELEPHONE ENCOUNTER
I refilled his medications but he has not been seen by me in 2 years.  Please encourage an office visit.

## 2021-06-25 NOTE — TELEPHONE ENCOUNTER
RN cannot approve Refill Request    RN can NOT refill this medication PCP messaged that patient is overdue for Office Visit. Last office visit: 11/9/2017 Marshall Khalil MD Last Physical: 3/29/2019 Last MTM visit: Visit date not found Last visit same specialty: 3/13/2020 Kaya Allen DO.  Next visit within 3 mo: Visit date not found  Next physical within 3 mo: Visit date not found      Naheed Virk, [facility] Triage/Med Refill 6/5/2021    Requested Prescriptions   Pending Prescriptions Disp Refills     metoprolol succinate (TOPROL-XL) 25 MG [Pharmacy Med Name: METOPROLOL ER SUCCINATE 25MG TABS] 90 tablet 3     Sig: TAKE 1 TABLET(25 MG) BY MOUTH DAILY       Beta-Blockers Refill Protocol Failed - 6/4/2021 10:35 PM        Failed - PCP or prescribing provider visit in past 12 months or next 3 months     Last office visit with prescriber/PCP: 11/9/2017 Marshall Khalil MD OR same dept: Visit date not found OR same specialty: 3/13/2020 Kaya Allen DO  Last physical: 3/29/2019 Last MTM visit: Visit date not found   Next visit within 3 mo: Visit date not found  Next physical within 3 mo: Visit date not found  Prescriber OR PCP: Marshall Khalil MD  Last diagnosis associated with med order: 1. Essential hypertension with goal blood pressure less than 140/90  - metoprolol succinate (TOPROL-XL) 25 MG [Pharmacy Med Name: METOPROLOL ER SUCCINATE 25MG TABS]; TAKE 1 TABLET(25 MG) BY MOUTH DAILY  Dispense: 90 tablet; Refill: 3    2. Mixed hyperlipidemia  - atorvastatin (LIPITOR) 80 MG tablet [Pharmacy Med Name: ATORVASTATIN 80MG TABLETS]; TAKE 1 TABLET(80 MG) BY MOUTH AT BEDTIME  Dispense: 90 tablet; Refill: 3    If protocol passes may refill for 12 months if within 3 months of last provider visit (or a total of 15 months).             Passed - Blood pressure filed in past 12 months     BP Readings from Last 1 Encounters:   09/16/20 140/86                atorvastatin (LIPITOR) 80 MG tablet  [Pharmacy Med Name: ATORVASTATIN 80MG TABLETS] 90 tablet 3     Sig: TAKE 1 TABLET(80 MG) BY MOUTH AT BEDTIME       Statins Refill Protocol (Hmg CoA Reductase Inhibitors) Failed - 6/4/2021 10:35 PM        Failed - PCP or prescribing provider visit in past 12 months      Last office visit with prescriber/PCP: 11/9/2017 Marshall Khalil MD OR same dept: Visit date not found OR same specialty: 3/13/2020 Kaya Allen DO  Last physical: 3/29/2019 Last MTM visit: Visit date not found   Next visit within 3 mo: Visit date not found  Next physical within 3 mo: Visit date not found  Prescriber OR PCP: Marshall Khalil MD  Last diagnosis associated with med order: 1. Essential hypertension with goal blood pressure less than 140/90  - metoprolol succinate (TOPROL-XL) 25 MG [Pharmacy Med Name: METOPROLOL ER SUCCINATE 25MG TABS]; TAKE 1 TABLET(25 MG) BY MOUTH DAILY  Dispense: 90 tablet; Refill: 3    2. Mixed hyperlipidemia  - atorvastatin (LIPITOR) 80 MG tablet [Pharmacy Med Name: ATORVASTATIN 80MG TABLETS]; TAKE 1 TABLET(80 MG) BY MOUTH AT BEDTIME  Dispense: 90 tablet; Refill: 3    If protocol passes may refill for 12 months if within 3 months of last provider visit (or a total of 15 months).

## 2021-06-26 NOTE — PROGRESS NOTES
Progress Notes by Salo Ta MD at 11/16/2018  9:30 AM     Author: Salo Ta MD Service: -- Author Type: Physician    Filed: 11/16/2018  9:52 AM Encounter Date: 11/16/2018 Status: Signed    : Salo Ta MD (Physician)           Click to link to [facility] HEART [facility] NOTE       Assessment/Plan:   A 53-year-old gentleman presents to clinic today for routine cardiology followup.     1. Coronary artery disease. He had a 5-vessel bypass surgery in December of 2012, LIMA to LAD, KAREN to OM1, saphenous vein graft to PDA, D1 and OM2. The patient has no chest pain or shortness of breath. Continue current medications, aspirin, metoprolol, and Lipitor.   Echocardiogram in December 2017 was reported normal left ventricular size, function and wall motion, no obvious valvular disease.    2.  Benign essential hypertension. His blood pressure is controlled well with metoprolol succinate to 25 mg daily.     3. Dyslipidemia: Continue Lipitor 80 mg nightly.  The patient wants to have lipid profile and liver function done at Dr. Khalil's office in January 2019.    Thank you for the opportunity to be involved in the care of Toney Bro. If you have any questions, please feel free to contact me.  I will see the patient again in 12 months.     History of Present Illness:   It is my pleasure to see Mr. Toney Bro today at the [facility] for routine cardiology followup. Toney is a 53-year-old gentleman with a medical history of coronary artery disease, status post 5-vessel bypass surgery in 12/2012, essential hypertension and hyperlipidemia.     The patient has been doing fine over the last year. He had chest pain, shortness of breath, palpitations, dizziness, orthopnea, PND or leg swelling.  He gained 10 pounds over last year.  His blood pressure and heart rate are controlled very well.  His cholesterol was also controlled very well.    Past Medical History:     Patient Active  "Problem List   Diagnosis   ? Insomnia   ? Psoriasis   ? Midback Pain   ? Coronary Artery Disease   ? Hypertension   ? Closed Fracture Of The Right Ankle   ? Mixed hyperlipidemia   ? Impaired Fasting Glucose       Past Surgical History:     Past Surgical History:   Procedure Laterality Date   ? [place] ABDOMEN SURGERY PROC UNLISTED      Description: Hernia Repair;  Recorded: 04/03/2008;       Family History:   History reviewed. No pertinent family history.     Social History:    reports that  has never smoked. he has never used smokeless tobacco.    Review of Systems:   General: WNL  Eyes: WNL  Ears/Nose/Throat: WNL  Lungs: WNL  Heart: WNL  Stomach: WNL  Bladder: WNL  Muscle/Joints: WNL  Skin: WNL  Nervous System: WNL  Mental Health: WNL     Blood: WNL    Meds:     Current Outpatient Medications:   ?  ASCORBATE CALCIUM (VITAMIN C ORAL), Take by mouth. 500mg daily, Disp: , Rfl:   ?  aspirin 81 MG EC tablet, Take 81 mg by mouth daily., Disp: , Rfl:   ?  atorvastatin (LIPITOR) 80 MG tablet, Take 1 tablet (80 mg total) by mouth at bedtime., Disp: 90 tablet, Rfl: 0  ?  cholecalciferol, vitamin D3, (VITAMIN D3) 1,000 unit capsule, Take 500 Units by mouth daily. , Disp: , Rfl:   ?  IBUPROFEN ORAL, Take by mouth as needed. , Disp: , Rfl:   ?  metoprolol succinate (TOPROL-XL) 25 MG, Take 1 tablet (25 mg total) by mouth daily., Disp: 90 tablet, Rfl: 0  ?  MULTIVIT &MINERALS/FERROUS FUM (MULTI VITAMIN ORAL), Take by mouth., Disp: , Rfl:   ?  nitroglycerin (NITROSTAT) 0.4 MG SL tablet, Take one PO Q 5 minutes prn chest x 3 prn, Disp: 25 tablet, Rfl: 1  ?  FLUoxetine (PROZAC) 10 MG capsule, Take 1 capsule (10 mg total) by mouth daily., Disp: 30 capsule, Rfl: 2    Allergies:   Lisinopril      Objective:      Physical Exam  217 lb (98.4 kg)  5' 11.5\" (1.816 m)  Body mass index is 29.84 kg/m .  /90 (Patient Site: Left Arm, Patient Position: Sitting, Cuff Size: Adult Large)   Pulse 64   Resp 16   Ht 5' 11.5\" (1.816 m)   Wt 217 " lb (98.4 kg)   BMI 29.84 kg/m      General Appearance:   Awake, Alert, No acute distress.   HEENT:  Pupil equal and reactive to light. No scleral icterus; the mucous membranes were moist.   Neck: No cervical bruits. No JVD. No thyromegaly.     Chest: The spine was straight. The chest was symmetric.   Lungs:   Respirations unlabored; Lungs are clear to auscultation. No crackles. No wheezing.   Cardiovascular:   Regular rhythm and rate, normal first and second heart sounds with no murmurs. No rubs or gallops.    Abdomen:  Soft. No tenderness. Non-distended. Bowels sounds are present   Extremities: Equal tibial pulses. No leg edema.   Skin: No rashes or ulcers. Warm, Dry.   Musculoskeletal: No tenderness. No deformity.   Neurologic: Mood and affect are appropriate. No focal deficits.       Cardiac Imaging studies:  ECHO on 12-5-2017:    When compared to the previous study dated 12/13/2012, no significant change.    Left ventricle ejection fraction is normal. The calculated left ventricular ejection fraction is 69%.    No hemodynamically significant valvular heart abnormalities.    Lab Review   Lab Results   Component Value Date     01/20/2017    K 4.4 01/20/2017     01/20/2017    CO2 27 01/20/2017    BUN 12 01/20/2017    CREATININE 0.79 01/20/2017    CALCIUM 9.5 01/20/2017     Lab Results   Component Value Date    WBC 6.1 07/22/2015    HGB 14.6 07/22/2015    HCT 41.8 07/22/2015    MCV 84 07/22/2015     07/22/2015     Lab Results   Component Value Date    CHOL 100 01/20/2017    CHOL 98 07/22/2015    CHOL 125 02/18/2014     Lab Results   Component Value Date    HDL 33 (L) 01/20/2017    HDL 40 07/22/2015    HDL 36 (L) 02/18/2014     Lab Results   Component Value Date    LDLCALC 55 01/20/2017    LDLCALC 42 07/22/2015    LDLCALC 75 02/18/2014     Lab Results   Component Value Date    TRIG 58 01/20/2017    TRIG 81 07/22/2015    TRIG 72 02/18/2014

## 2021-06-29 NOTE — PROGRESS NOTES
Progress Notes by Salo Ta MD at 9/16/2020  3:30 PM     Author: Salo Ta MD Service: -- Author Type: Physician    Filed: 9/16/2020  3:46 PM Encounter Date: 9/16/2020 Status: Signed    : Salo Ta MD (Physician)           Click to link to [facility] HEART [facility] NOTE       Assessment/Plan:   A 55-year-old gentleman presents to clinic today for routine cardiology followup.     1. Coronary artery disease. He had a 5-vessel bypass surgery in December of 2012, LIMA to LAD, KAREN to OM1, saphenous vein graft to PDA, D1 and OM2. The patient has no chest pain or shortness of breath. Continue current medications, aspirin, metoprolol, and Lipitor.   Echocardiogram in December 2017 was reported normal left ventricular size, function and wall motion, no obvious valvular disease.    2.  Benign essential hypertension. His blood pressure is controlled well with metoprolol succinate to 25 mg daily.     3. Dyslipidemia: Continue Lipitor 80 mg nightly.  Advised the patient to have routine labs with lipid profile at Dr. Khalil's office.    Thank you for the opportunity to be involved in the care of Toney Bro. If you have any questions, please feel free to contact me.  I will see the patient again in 12 months and as needed.     History of Present Illness:   It is my pleasure to see Mr. Toney Bro today at the [facility] for routine cardiology followup. Toney is a 55-year-old gentleman with a medical history of coronary artery disease, status post 5-vessel bypass surgery in 12/2012, essential hypertension and hyperlipidemia.     The patient has been doing fine over the last year. He had no chest pain, shortness of breath, palpitations, dizziness, orthopnea, PND or leg swelling.  His blood pressure and heart rate are controlled.  His cholesterol was also controlled very well.    Past Medical History:     Patient Active Problem List   Diagnosis   ? Insomnia   ? Psoriasis  "  ? Midback Pain   ? Coronary Artery Disease   ? Hypertension   ? Closed Fracture Of The Right Ankle   ? Mixed hyperlipidemia   ? Impaired Fasting Glucose   ? Adenomatous polyp of ascending colon       Past Surgical History:     Past Surgical History:   Procedure Laterality Date   ? ID ABDOMEN SURGERY PROC UNLISTED      Description: Hernia Repair;  Recorded: 04/03/2008;       Family History:   History reviewed. No pertinent family history.     Social History:    reports that he has never smoked. He has never used smokeless tobacco.    Review of Systems:   General: WNL  Eyes: WNL  Ears/Nose/Throat: WNL  Lungs: WNL  Heart: WNL  Stomach: WNL  Bladder: WNL  Muscle/Joints: WNL  Skin: WNL  Nervous System: WNL  Mental Health: WNL     Blood: WNL    Meds:     Current Outpatient Medications:   ?  ASCORBATE CALCIUM (VITAMIN C ORAL), Take by mouth. 500mg daily, Disp: , Rfl:   ?  aspirin 81 MG EC tablet, Take 81 mg by mouth daily., Disp: , Rfl:   ?  atorvastatin (LIPITOR) 80 MG tablet, TAKE 1 TABLET(80 MG) BY MOUTH AT BEDTIME, Disp: 90 tablet, Rfl: 3  ?  cholecalciferol, vitamin D3, (VITAMIN D3) 1,000 unit capsule, Take 500 Units by mouth daily. , Disp: , Rfl:   ?  IBUPROFEN ORAL, Take by mouth as needed. , Disp: , Rfl:   ?  metoprolol succinate (TOPROL-XL) 25 MG, TAKE 1 TABLET(25 MG) BY MOUTH DAILY, Disp: 90 tablet, Rfl: 3  ?  MULTIVIT &MINERALS/FERROUS FUM (MULTI VITAMIN ORAL), Take by mouth., Disp: , Rfl:   ?  nitroglycerin (NITROSTAT) 0.4 MG SL tablet, Take one PO Q 5 minutes prn chest x 3 prn, Disp: 25 tablet, Rfl: 1    Allergies:   Lisinopril      Objective:      Physical Exam  205 lb (93 kg)  5' 10.75\" (1.797 m)  Body mass index is 28.79 kg/m .  /86 (Patient Site: Right Arm, Patient Position: Sitting, Cuff Size: Adult Regular)   Pulse (!) 56   Resp 16   Ht 5' 10.75\" (1.797 m)   Wt 205 lb (93 kg)   BMI 28.79 kg/m      General Appearance:   Awake, Alert, No acute distress.   HEENT:  Pupil equal and reactive to " light. No scleral icterus; the mucous membranes were moist.   Neck: No cervical bruits. No JVD. No thyromegaly.     Chest: The spine was straight. The chest was symmetric.   Lungs:   Respirations unlabored; Lungs are clear to auscultation. No crackles. No wheezing.   Cardiovascular:   Regular rhythm and rate, normal first and second heart sounds with no murmurs. No rubs or gallops.    Abdomen:  Soft. No tenderness. Non-distended. Bowels sounds are present   Extremities: Equal tibial pulses. No leg edema.   Skin: No rashes or ulcers. Warm, Dry.   Musculoskeletal: No tenderness. No deformity.   Neurologic: Mood and affect are appropriate. No focal deficits.       Cardiac Imaging studies:  ECHO on 12-5-2017:    When compared to the previous study dated 12/13/2012, no significant change.    Left ventricle ejection fraction is normal. The calculated left ventricular ejection fraction is 69%.    No hemodynamically significant valvular heart abnormalities.    Lab Review   Lab Results   Component Value Date     03/29/2019    K 4.1 03/29/2019     03/29/2019    CO2 29 03/29/2019    BUN 8 03/29/2019    CREATININE 0.79 03/29/2019    CALCIUM 9.5 03/29/2019     Lab Results   Component Value Date    WBC 4.7 03/29/2019    WBC 6.1 07/22/2015    HGB 14.2 03/29/2019    HCT 41.3 03/29/2019    MCV 88 03/29/2019     03/29/2019     Lab Results   Component Value Date    CHOL 112 03/29/2019    CHOL 100 01/20/2017    CHOL 98 07/22/2015     Lab Results   Component Value Date    HDL 42 03/29/2019    HDL 33 (L) 01/20/2017    HDL 40 07/22/2015     Lab Results   Component Value Date    LDLCALC 59 03/29/2019    LDLCALC 55 01/20/2017    LDLCALC 42 07/22/2015     Lab Results   Component Value Date    TRIG 57 03/29/2019    TRIG 58 01/20/2017    TRIG 81 07/22/2015

## 2021-09-02 DIAGNOSIS — E78.2 MIXED HYPERLIPIDEMIA: ICD-10-CM

## 2021-09-03 RX ORDER — ATORVASTATIN CALCIUM 80 MG/1
TABLET, FILM COATED ORAL
Qty: 30 TABLET | Refills: 0 | Status: SHIPPED | OUTPATIENT
Start: 2021-09-03 | End: 2022-10-04

## 2021-09-03 NOTE — TELEPHONE ENCOUNTER
"Routing refill request to provider for review/approval because:  Labs not current:  LDL    Last Written Prescription Date:  6/7/21  Last Fill Quantity: 90,  # refills: 0   Last office visit provider:  6/22/21     Requested Prescriptions   Pending Prescriptions Disp Refills     atorvastatin (LIPITOR) 80 MG tablet 90 tablet 0       Statins Protocol Failed - 9/2/2021  1:43 PM        Failed - LDL on file in past 12 months     Recent Labs   Lab Test 03/29/19  0938   LDL 59             Passed - No abnormal creatine kinase in past 12 months     No lab results found.             Passed - Recent (12 mo) or future (30 days) visit within the authorizing provider's specialty     Patient has had an office visit with the authorizing provider or a provider within the authorizing providers department within the previous 12 mos or has a future within next 30 days. See \"Patient Info\" tab in inbasket, or \"Choose Columns\" in Meds & Orders section of the refill encounter.              Passed - Medication is active on med list        Passed - Patient is age 18 or older             Mahad Salmon RN 09/03/21 10:44 AM  "

## 2021-09-07 ENCOUNTER — TELEPHONE (OUTPATIENT)
Dept: FAMILY MEDICINE | Facility: CLINIC | Age: 56
End: 2021-09-07

## 2021-09-07 DIAGNOSIS — I10 ESSENTIAL HYPERTENSION WITH GOAL BLOOD PRESSURE LESS THAN 140/90: ICD-10-CM

## 2021-09-07 RX ORDER — METOPROLOL SUCCINATE 25 MG/1
25 TABLET, EXTENDED RELEASE ORAL DAILY
Qty: 90 TABLET | Refills: 0 | Status: SHIPPED | OUTPATIENT
Start: 2021-09-07 | End: 2021-12-06

## 2021-09-07 NOTE — TELEPHONE ENCOUNTER
Reason for Call: patient is calling for a refill of    metoprolol succinate (TOPROL-XL) 25 MG 90 tablet       SEND TO [facility]     Detailed comments: LAST SEEN 06/22/2021. No refills at pharmacy, patient is out of medication. Would appreciate refill for 90 days, asap.    Phone Number Patient can be reached at: Home number on file [phone] (home)    Best Time: any    Can we leave a detailed message on this number? YES    Call taken on 9/7/2021 at 11:21 AM by Melyssa Francois

## 2021-09-07 NOTE — TELEPHONE ENCOUNTER
Please call.  There is a refill request for Metroprolol.  I sent a refill for 90 days.  However, he was last seen in June of this year and has not had any laboratory testing.  The orders have been entered so he should make sure to set up a laboratory appointment at his convenience.

## 2021-09-09 ENCOUNTER — LAB (OUTPATIENT)
Dept: LAB | Facility: CLINIC | Age: 56
End: 2021-09-09
Payer: COMMERCIAL

## 2021-09-09 DIAGNOSIS — I10 ESSENTIAL HYPERTENSION WITH GOAL BLOOD PRESSURE LESS THAN 140/90: ICD-10-CM

## 2021-09-09 DIAGNOSIS — E78.2 MIXED HYPERLIPIDEMIA: ICD-10-CM

## 2021-09-09 DIAGNOSIS — Z00.00 ROUTINE GENERAL MEDICAL EXAMINATION AT A HEALTH CARE FACILITY: ICD-10-CM

## 2021-09-09 LAB
ALBUMIN SERPL-MCNC: 4.3 G/DL (ref 3.5–5)
ALP SERPL-CCNC: 43 U/L (ref 45–120)
ALT SERPL W P-5'-P-CCNC: 27 U/L (ref 0–45)
ANION GAP SERPL CALCULATED.3IONS-SCNC: 9 MMOL/L (ref 5–18)
AST SERPL W P-5'-P-CCNC: 34 U/L (ref 0–40)
BILIRUB DIRECT SERPL-MCNC: 0.3 MG/DL
BILIRUB SERPL-MCNC: 0.6 MG/DL (ref 0–1)
BUN SERPL-MCNC: 9 MG/DL (ref 8–22)
CALCIUM SERPL-MCNC: 9.7 MG/DL (ref 8.5–10.5)
CHLORIDE BLD-SCNC: 101 MMOL/L (ref 98–107)
CHOLEST SERPL-MCNC: 124 MG/DL
CO2 SERPL-SCNC: 29 MMOL/L (ref 22–31)
CREAT SERPL-MCNC: 0.77 MG/DL (ref 0.7–1.3)
ERYTHROCYTE [DISTWIDTH] IN BLOOD BY AUTOMATED COUNT: 12.7 % (ref 10–15)
FASTING STATUS PATIENT QL REPORTED: YES
GFR SERPL CREATININE-BSD FRML MDRD: >90 ML/MIN/1.73M2
GLUCOSE BLD-MCNC: 106 MG/DL (ref 70–125)
HCT VFR BLD AUTO: 43.6 % (ref 40–53)
HCV AB SERPL QL IA: POSITIVE
HDLC SERPL-MCNC: 44 MG/DL
HGB BLD-MCNC: 14.8 G/DL (ref 13.3–17.7)
HIV 1+2 AB+HIV1 P24 AG SERPL QL IA: NEGATIVE
LDLC SERPL CALC-MCNC: 60 MG/DL
MCH RBC QN AUTO: 29.8 PG (ref 26.5–33)
MCHC RBC AUTO-ENTMCNC: 33.9 G/DL (ref 31.5–36.5)
MCV RBC AUTO: 88 FL (ref 78–100)
PLATELET # BLD AUTO: 171 10E3/UL (ref 150–450)
POTASSIUM BLD-SCNC: 4.5 MMOL/L (ref 3.5–5)
PROT SERPL-MCNC: 7.1 G/DL (ref 6–8)
PSA SERPL-MCNC: 0.81 UG/L (ref 0–3.5)
RBC # BLD AUTO: 4.96 10E6/UL (ref 4.4–5.9)
SODIUM SERPL-SCNC: 139 MMOL/L (ref 136–145)
TRIGL SERPL-MCNC: 99 MG/DL
WBC # BLD AUTO: 7.3 10E3/UL (ref 4–11)

## 2021-09-09 PROCEDURE — G0103 PSA SCREENING: HCPCS

## 2021-09-09 PROCEDURE — 36415 COLL VENOUS BLD VENIPUNCTURE: CPT

## 2021-09-09 PROCEDURE — 85027 COMPLETE CBC AUTOMATED: CPT

## 2021-09-09 PROCEDURE — 86803 HEPATITIS C AB TEST: CPT

## 2021-09-09 PROCEDURE — 87389 HIV-1 AG W/HIV-1&-2 AB AG IA: CPT

## 2021-09-09 PROCEDURE — 80053 COMPREHEN METABOLIC PANEL: CPT

## 2021-09-09 PROCEDURE — 80061 LIPID PANEL: CPT

## 2021-09-09 PROCEDURE — 82248 BILIRUBIN DIRECT: CPT

## 2021-09-30 ENCOUNTER — TELEPHONE (OUTPATIENT)
Dept: FAMILY MEDICINE | Facility: CLINIC | Age: 56
End: 2021-09-30

## 2021-09-30 DIAGNOSIS — R76.8 HEPATITIS C ANTIBODY POSITIVE IN BLOOD: Primary | ICD-10-CM

## 2021-09-30 NOTE — TELEPHONE ENCOUNTER
Patient called and informed that hepatitis C antibody test is positive.  I recommend a quantitative hepatitis C test.  Depending on results patient will be referral to infectious disease.    Patient agrees to plan.

## 2021-12-04 DIAGNOSIS — I10 ESSENTIAL HYPERTENSION WITH GOAL BLOOD PRESSURE LESS THAN 140/90: ICD-10-CM

## 2021-12-06 RX ORDER — METOPROLOL SUCCINATE 25 MG/1
25 TABLET, EXTENDED RELEASE ORAL DAILY
Qty: 90 TABLET | Refills: 2 | Status: SHIPPED | OUTPATIENT
Start: 2021-12-06 | End: 2022-10-04

## 2021-12-06 NOTE — TELEPHONE ENCOUNTER
"Last Written Prescription Date:  9/7/21  Last Fill Quantity: 90,  # refills: 0   Last office visit provider:  6/22/21     Requested Prescriptions   Pending Prescriptions Disp Refills     metoprolol succinate ER (TOPROL-XL) 25 MG 24 hr tablet [Pharmacy Med Name: METOPROLOL ER SUCCINATE 25MG TABS] 90 tablet 0     Sig: TAKE 1 TABLET(25 MG) BY MOUTH DAILY       Beta-Blockers Protocol Passed - 12/4/2021  3:29 AM        Passed - Blood pressure under 140/90 in past 12 months     BP Readings from Last 3 Encounters:   06/22/21 129/82   09/16/20 (!) 140/86   03/13/20 128/88                 Passed - Patient is age 6 or older        Passed - Recent (12 mo) or future (30 days) visit within the authorizing provider's specialty     Patient has had an office visit with the authorizing provider or a provider within the authorizing providers department within the previous 12 mos or has a future within next 30 days. See \"Patient Info\" tab in inbasket, or \"Choose Columns\" in Meds & Orders section of the refill encounter.              Passed - Medication is active on med list             Mahad Salmon RN 12/06/21 11:31 AM  "

## 2022-02-24 ENCOUNTER — TELEPHONE (OUTPATIENT)
Dept: FAMILY MEDICINE | Facility: CLINIC | Age: 57
End: 2022-02-24
Payer: COMMERCIAL

## 2022-02-24 DIAGNOSIS — Z12.11 COLON CANCER SCREENING: Primary | ICD-10-CM

## 2022-02-24 NOTE — TELEPHONE ENCOUNTER
Patient calling to let Dr Khalil know that he has decided to have a colonoscopy and to have his blood drawn that was ordered in sept 2021.    He is scheduled with the lab on 2/28.  Please send referral for colonoscopy

## 2022-02-24 NOTE — TELEPHONE ENCOUNTER
I entered a referral for a colonoscopy.  The lab orders are in place for a hepatitis C quantitative level and liver profile.

## 2022-02-28 ENCOUNTER — LAB (OUTPATIENT)
Dept: LAB | Facility: CLINIC | Age: 57
End: 2022-02-28
Payer: COMMERCIAL

## 2022-02-28 DIAGNOSIS — Z12.11 COLON CANCER SCREENING: ICD-10-CM

## 2022-02-28 DIAGNOSIS — R76.8 HEPATITIS C ANTIBODY POSITIVE IN BLOOD: ICD-10-CM

## 2022-02-28 LAB
ALBUMIN SERPL-MCNC: 4.1 G/DL (ref 3.5–5)
ALP SERPL-CCNC: 41 U/L (ref 45–120)
ALT SERPL W P-5'-P-CCNC: 25 U/L (ref 0–45)
AST SERPL W P-5'-P-CCNC: 33 U/L (ref 0–40)
BILIRUB DIRECT SERPL-MCNC: 0.3 MG/DL
BILIRUB SERPL-MCNC: 0.7 MG/DL (ref 0–1)
PROT SERPL-MCNC: 6.8 G/DL (ref 6–8)

## 2022-02-28 PROCEDURE — 36415 COLL VENOUS BLD VENIPUNCTURE: CPT

## 2022-02-28 PROCEDURE — 87522 HEPATITIS C REVRS TRNSCRPJ: CPT

## 2022-02-28 PROCEDURE — 80076 HEPATIC FUNCTION PANEL: CPT

## 2022-03-02 LAB — HCV RNA SERPL NAA+PROBE-ACNC: NOT DETECTED IU/ML

## 2022-08-24 DIAGNOSIS — I10 ESSENTIAL HYPERTENSION WITH GOAL BLOOD PRESSURE LESS THAN 140/90: Primary | ICD-10-CM

## 2022-08-26 RX ORDER — METOPROLOL SUCCINATE 25 MG/1
25 TABLET, EXTENDED RELEASE ORAL DAILY
Qty: 90 TABLET | Refills: 0 | Status: SHIPPED | OUTPATIENT
Start: 2022-08-26 | End: 2022-10-04

## 2022-08-26 NOTE — TELEPHONE ENCOUNTER
"Routing refill request to provider for review/approval because:  Patient needs to be seen because it has been more than 1 year since last office visit.  BP not current    Last Written Prescription Date:  12/6/21  Last Fill Quantity: 90,  # refills: 2   Last office visit provider:  6/22/21     Requested Prescriptions   Pending Prescriptions Disp Refills     metoprolol succinate ER (TOPROL XL) 25 MG 24 hr tablet [Pharmacy Med Name: METOPROLOL ER SUCCINATE 25MG TABS] 90 tablet      Sig: TAKE 1 TABLET(25 MG) BY MOUTH DAILY       Beta-Blockers Protocol Failed - 8/24/2022  9:31 PM        Failed - Blood pressure under 140/90 in past 12 months     BP Readings from Last 3 Encounters:   06/22/21 129/82   09/16/20 (!) 140/86   03/13/20 128/88                 Failed - Recent (12 mo) or future (30 days) visit within the authorizing provider's specialty     Patient has had an office visit with the authorizing provider or a provider within the authorizing providers department within the previous 12 mos or has a future within next 30 days. See \"Patient Info\" tab in inbasket, or \"Choose Columns\" in Meds & Orders section of the refill encounter.              Passed - Patient is age 6 or older        Passed - Medication is active on med list             Mahad Salmon RN 08/26/22 11:38 AM  "

## 2022-09-03 ENCOUNTER — HEALTH MAINTENANCE LETTER (OUTPATIENT)
Age: 57
End: 2022-09-03

## 2022-09-15 ENCOUNTER — TRANSFERRED RECORDS (OUTPATIENT)
Dept: HEALTH INFORMATION MANAGEMENT | Facility: CLINIC | Age: 57
End: 2022-09-15

## 2022-10-03 ASSESSMENT — ENCOUNTER SYMPTOMS
JOINT SWELLING: 0
CONSTIPATION: 0
PARESTHESIAS: 0
HEMATURIA: 0
HEADACHES: 0
COUGH: 0
FREQUENCY: 0
DIARRHEA: 0
PALPITATIONS: 0
FEVER: 0
WEAKNESS: 0
NAUSEA: 0
EYE PAIN: 0
DYSURIA: 0
HEARTBURN: 0
SHORTNESS OF BREATH: 0
DIZZINESS: 0
HEMATOCHEZIA: 0
MYALGIAS: 0
ARTHRALGIAS: 0
ABDOMINAL PAIN: 0
SORE THROAT: 0
NERVOUS/ANXIOUS: 0
CHILLS: 0

## 2022-10-04 ENCOUNTER — OFFICE VISIT (OUTPATIENT)
Dept: FAMILY MEDICINE | Facility: CLINIC | Age: 57
End: 2022-10-04
Payer: COMMERCIAL

## 2022-10-04 VITALS
RESPIRATION RATE: 16 BRPM | HEIGHT: 70 IN | WEIGHT: 198 LBS | HEART RATE: 56 BPM | BODY MASS INDEX: 28.35 KG/M2 | DIASTOLIC BLOOD PRESSURE: 95 MMHG | SYSTOLIC BLOOD PRESSURE: 149 MMHG

## 2022-10-04 DIAGNOSIS — D12.2 ADENOMATOUS POLYP OF ASCENDING COLON: ICD-10-CM

## 2022-10-04 DIAGNOSIS — R73.01 IMPAIRED FASTING GLUCOSE: ICD-10-CM

## 2022-10-04 DIAGNOSIS — I25.810 ATHEROSCLEROSIS OF CORONARY ARTERY BYPASS GRAFT OF NATIVE HEART WITHOUT ANGINA PECTORIS: ICD-10-CM

## 2022-10-04 DIAGNOSIS — R21 RASH: ICD-10-CM

## 2022-10-04 DIAGNOSIS — Z00.00 ROUTINE GENERAL MEDICAL EXAMINATION AT A HEALTH CARE FACILITY: Primary | ICD-10-CM

## 2022-10-04 DIAGNOSIS — I10 ESSENTIAL HYPERTENSION WITH GOAL BLOOD PRESSURE LESS THAN 140/90: ICD-10-CM

## 2022-10-04 DIAGNOSIS — E78.2 MIXED HYPERLIPIDEMIA: ICD-10-CM

## 2022-10-04 LAB
ALBUMIN SERPL BCG-MCNC: 4.6 G/DL (ref 3.5–5.2)
ALP SERPL-CCNC: 36 U/L (ref 40–129)
ALT SERPL W P-5'-P-CCNC: 22 U/L (ref 10–50)
ANION GAP SERPL CALCULATED.3IONS-SCNC: 8 MMOL/L (ref 7–15)
AST SERPL W P-5'-P-CCNC: 35 U/L (ref 10–50)
BILIRUB DIRECT SERPL-MCNC: <0.2 MG/DL (ref 0–0.3)
BILIRUB SERPL-MCNC: 0.6 MG/DL
BUN SERPL-MCNC: 10.7 MG/DL (ref 8–23)
CALCIUM SERPL-MCNC: 9.5 MG/DL (ref 8.6–10)
CHLORIDE SERPL-SCNC: 101 MMOL/L (ref 98–107)
CHOLEST SERPL-MCNC: 114 MG/DL
CREAT SERPL-MCNC: 0.71 MG/DL (ref 0.67–1.17)
DEPRECATED HCO3 PLAS-SCNC: 29 MMOL/L (ref 22–29)
ERYTHROCYTE [DISTWIDTH] IN BLOOD BY AUTOMATED COUNT: 12.5 % (ref 10–15)
GFR SERPL CREATININE-BSD FRML MDRD: >90 ML/MIN/1.73M2
GLUCOSE SERPL-MCNC: 106 MG/DL (ref 70–99)
HCT VFR BLD AUTO: 41.8 % (ref 40–53)
HDLC SERPL-MCNC: 47 MG/DL
HGB BLD-MCNC: 14.4 G/DL (ref 13.3–17.7)
LDLC SERPL CALC-MCNC: 54 MG/DL
MCH RBC QN AUTO: 30.5 PG (ref 26.5–33)
MCHC RBC AUTO-ENTMCNC: 34.4 G/DL (ref 31.5–36.5)
MCV RBC AUTO: 89 FL (ref 78–100)
NONHDLC SERPL-MCNC: 67 MG/DL
PLATELET # BLD AUTO: 148 10E3/UL (ref 150–450)
POTASSIUM SERPL-SCNC: 4.4 MMOL/L (ref 3.4–5.3)
PROT SERPL-MCNC: 7 G/DL (ref 6.4–8.3)
PSA SERPL-MCNC: 0.66 NG/ML (ref 0–3.5)
RBC # BLD AUTO: 4.72 10E6/UL (ref 4.4–5.9)
SODIUM SERPL-SCNC: 138 MMOL/L (ref 136–145)
TRIGL SERPL-MCNC: 63 MG/DL
WBC # BLD AUTO: 4.2 10E3/UL (ref 4–11)

## 2022-10-04 PROCEDURE — 99396 PREV VISIT EST AGE 40-64: CPT | Mod: 25 | Performed by: FAMILY MEDICINE

## 2022-10-04 PROCEDURE — 90471 IMMUNIZATION ADMIN: CPT | Performed by: FAMILY MEDICINE

## 2022-10-04 PROCEDURE — 80061 LIPID PANEL: CPT | Performed by: FAMILY MEDICINE

## 2022-10-04 PROCEDURE — 80053 COMPREHEN METABOLIC PANEL: CPT | Performed by: FAMILY MEDICINE

## 2022-10-04 PROCEDURE — 82248 BILIRUBIN DIRECT: CPT | Performed by: FAMILY MEDICINE

## 2022-10-04 PROCEDURE — 87902 NFCT AGT GNTYP ALYS HEP C: CPT | Performed by: FAMILY MEDICINE

## 2022-10-04 PROCEDURE — G0103 PSA SCREENING: HCPCS | Performed by: FAMILY MEDICINE

## 2022-10-04 PROCEDURE — 99214 OFFICE O/P EST MOD 30 MIN: CPT | Mod: 25 | Performed by: FAMILY MEDICINE

## 2022-10-04 PROCEDURE — 85027 COMPLETE CBC AUTOMATED: CPT | Performed by: FAMILY MEDICINE

## 2022-10-04 PROCEDURE — 36415 COLL VENOUS BLD VENIPUNCTURE: CPT | Performed by: FAMILY MEDICINE

## 2022-10-04 PROCEDURE — 90715 TDAP VACCINE 7 YRS/> IM: CPT | Performed by: FAMILY MEDICINE

## 2022-10-04 RX ORDER — METOPROLOL SUCCINATE 25 MG/1
25 TABLET, EXTENDED RELEASE ORAL DAILY
Qty: 90 TABLET | Refills: 3
Start: 2022-10-04 | End: 2022-11-23

## 2022-10-04 RX ORDER — ATORVASTATIN CALCIUM 80 MG/1
TABLET, FILM COATED ORAL
Qty: 90 TABLET | Refills: 3 | Status: SHIPPED | OUTPATIENT
Start: 2022-10-04 | End: 2023-11-24

## 2022-10-04 ASSESSMENT — ENCOUNTER SYMPTOMS
HEMATOCHEZIA: 0
HEADACHES: 0
FEVER: 0
FREQUENCY: 0
NAUSEA: 0
SHORTNESS OF BREATH: 0
EYE PAIN: 0
ABDOMINAL PAIN: 0
DIARRHEA: 0
COUGH: 0
ARTHRALGIAS: 0
NERVOUS/ANXIOUS: 0
PALPITATIONS: 0
CONSTIPATION: 0
WEAKNESS: 0
JOINT SWELLING: 0
MYALGIAS: 0
HEARTBURN: 0
CHILLS: 0
SORE THROAT: 0
PARESTHESIAS: 0
DIZZINESS: 0
HEMATURIA: 0
DYSURIA: 0

## 2022-10-04 NOTE — PROGRESS NOTES
SUBJECTIVE:   CC: Toney is an 57 year old who presents for preventative health visit.  He is using his    This is a pleasant 57-year-old male who presents to the clinic for a complete physical examination.    His medical history is notable for coronary artery disease, hypertension, and hyperlipidemia.  He had a 5 vessel bypass surgery in 2012 and continues to follow-up with Dr. Ta, cardiology.  He has been stable from a cardiovascular perspective.  He continues to take the maximum dose of atorvastatin.  His last total cholesterol is 124 with an LDL of 60.      His his last echocardiogram was in 2017 and revealed a normal ejection fraction of 69%.     His previous colonoscopy was in September 2022.  He reportedly had 3 polyps and will have a recheck in 3 years.    He continues to have a rash on his right palm which has been scaly.  This can be itchy at times.  He does have a history of psoriasis.  He also has had a stye involving his left lower eyelid.    He states he generally has done well.  He denies headache, dizziness, chest pain, or palpitations.     He does note that he has had a rash on his right palm and also involving the finger of his left hand.  This can be scaly and somewhat itchy.  He does have a history of psoriasis and does use topical steroids.  Patient has been advised of split billing requirements and indicates understanding: Yes  Healthy Habits:     Getting at least 3 servings of Calcium per day:  Yes    Bi-annual eye exam:  NO    Dental care twice a year:  NO    Sleep apnea or symptoms of sleep apnea:  Daytime drowsiness    Diet:  Low salt    Frequency of exercise:  6-7 days/week    Duration of exercise:  45-60 minutes    Taking medications regularly:  Yes    PHQ-2 Total Score: 0    Additional concerns today:  No      Today's PHQ-2 Score:   PHQ-2 ( 1999 [employer]) 10/3/2022   Q1: Little interest or pleasure in doing things 0   Q2: Feeling down, depressed or hopeless 0   PHQ-2 Score 0   Q1: Little  interest or pleasure in doing things Not at all   Q2: Feeling down, depressed or hopeless Not at all   PHQ-2 Score 0       Abuse: Current or Past(Physical, Sexual or Emotional)- No  Do you feel safe in your environment? Yes        Social History     Tobacco Use     Smoking status: Never Smoker     Smokeless tobacco: Never Used   Substance Use Topics     Alcohol use: Not on file     If you drink alcohol do you typically have >3 drinks per day or >7 drinks per week? Yes      Alcohol Use 10/3/2022   Prescreen: >3 drinks/day or >7 drinks/week? Yes     AUDIT - Alcohol Use Disorders Identification Test - Reproduced from the World Health Organization Audit 2001 (Second Edition) 10/3/2022   1.  How often do you have a drink containing alcohol? 4 or more times a week   2.  How many drinks containing alcohol do you have on a typical day when you are drinking? 1 or 2   3.  How often do you have five or more drinks on one occasion? Never   9.  Have you or someone else been injured because of your drinking? No   10. Has a relative, friend, doctor or other health care worker been concerned about your drinking or suggested you cut down? No       Last PSA:   Prostate Specific Antigen Screen   Date Value Ref Range Status   09/09/2021 0.81 0.00 - 3.50 ug/L Final       Reviewed orders with patient. Reviewed health maintenance and updated orders accordingly - Yes  Patient Active Problem List   Diagnosis     Insomnia     Psoriasis     Midback Pain     Coronary Artery Disease     Hypertension     Closed Fracture Of The Right Ankle     Mixed hyperlipidemia     Impaired Fasting Glucose     Adenomatous polyp of ascending colon     Past Surgical History:   Procedure Laterality Date     C UNLISTED PROCEDURE, ABDOMEN/PERITONEUM/OMENTUM      Description: Hernia Repair;  Recorded: 04/03/2008;       Social History     Tobacco Use     Smoking status: Never Smoker     Smokeless tobacco: Never Used   Substance Use Topics     Alcohol use: Not on file      Family History   Problem Relation Age of Onset     Hypertension Mother      Hypertension Father      Heart Disease Father          Current Outpatient Medications   Medication Sig Dispense Refill     ASCORBATE CALCIUM (VITAMIN C ORAL) [ASCORBATE CALCIUM (VITAMIN C ORAL)] Take by mouth. 500mg daily       aspirin 81 MG EC tablet [ASPIRIN 81 MG EC TABLET] Take 81 mg by mouth daily.       atorvastatin (LIPITOR) 80 MG tablet [ATORVASTATIN (LIPITOR) 80 MG TABLET] TAKE 1 TABLET(80 MG) BY MOUTH AT BEDTIME 90 tablet 3     cholecalciferol, vitamin D3, (VITAMIN D3) 1,000 unit capsule [CHOLECALCIFEROL, VITAMIN D3, (VITAMIN D3) 1,000 UNIT CAPSULE] Take 500 Units by mouth daily.        IBUPROFEN ORAL [IBUPROFEN ORAL] Take by mouth as needed.        metoprolol succinate ER (TOPROL XL) 25 MG 24 hr tablet Take 1 tablet (25 mg) by mouth daily 90 tablet 3     MULTIVIT &MINERALS/FERROUS FUM (MULTI VITAMIN ORAL) [MULTIVIT &MINERALS/FERROUS FUM (MULTI VITAMIN ORAL)] Take by mouth.       nitroglycerin (NITROSTAT) 0.4 MG SL tablet [NITROGLYCERIN (NITROSTAT) 0.4 MG SL TABLET] Take one PO Q 5 minutes prn chest x 3 prn 25 tablet 1     nitroglycerin (NITROSTAT) 0.4 MG SL tablet [NITROGLYCERIN (NITROSTAT) 0.4 MG SL TABLET] Take one PO Q 5 minutes prn chest x 3 prn 25 tablet 1     Allergies   Allergen Reactions     Lisinopril Unknown       Reviewed and updated as needed this visit by clinical staff   Tobacco  Allergies  Meds                Reviewed and updated as needed this visit by Provider                   History reviewed. No pertinent past medical history.   Past Surgical History:   Procedure Laterality Date     C UNLISTED PROCEDURE, ABDOMEN/PERITONEUM/OMENTUM      Description: Hernia Repair;  Recorded: 04/03/2008;       Review of Systems   Constitutional: Negative for chills and fever.   HENT: Negative for congestion, ear pain, hearing loss and sore throat.    Eyes: Negative for pain and visual disturbance.   Respiratory: Negative  "for cough and shortness of breath.    Cardiovascular: Negative for chest pain, palpitations and peripheral edema.   Gastrointestinal: Negative for abdominal pain, constipation, diarrhea, heartburn, hematochezia and nausea.   Genitourinary: Negative for dysuria, frequency, genital sores, hematuria, impotence, penile discharge and urgency.   Musculoskeletal: Negative for arthralgias, joint swelling and myalgias.   Skin: Negative for rash.   Neurological: Negative for dizziness, weakness, headaches and paresthesias.   Psychiatric/Behavioral: Negative for mood changes. The patient is not nervous/anxious.      CONSTITUTIONAL: NEGATIVE for fever, chills, change in weight  INTEGUMENTARY/SKIN: NEGATIVE for worrisome rashes, moles or lesions  EYES: NEGATIVE for vision changes or irritation  ENT: NEGATIVE for ear, mouth and throat problems  RESP: NEGATIVE for significant cough or SOB  CV: NEGATIVE for chest pain, palpitations or peripheral edema  GI: NEGATIVE for nausea, abdominal pain, heartburn, or change in bowel habits   male: negative for dysuria, hematuria, decreased urinary stream, erectile dysfunction, urethral discharge  MUSCULOSKELETAL: NEGATIVE for significant arthralgias or myalgia  NEURO: NEGATIVE for weakness, dizziness or paresthesias  PSYCHIATRIC: NEGATIVE for changes in mood or affect    OBJECTIVE:   BP (!) 149/95 (BP Location: Left arm, Patient Position: Sitting, Cuff Size: Adult Regular)   Pulse 56   Resp 16   Ht 1.775 m (5' 9.88\")   Wt 89.8 kg (198 lb)   BMI 28.51 kg/m      Physical Exam  GENERAL: healthy, alert and no distress  EYES: Eyes grossly normal to inspection, PERRL and conjunctivae and sclerae normal  HENT: ear canals and TM's normal, nose and mouth without ulcers or lesions  NECK: no adenopathy, no asymmetry, masses, or scars and thyroid normal to palpation  RESP: lungs clear to auscultation - no rales, rhonchi or wheezes  CV: regular rate and rhythm, normal S1 S2, no S3 or S4, no murmur, " click or rub, no peripheral edema and peripheral pulses strong  ABDOMEN: soft, nontender, no hepatosplenomegaly, no masses and bowel sounds normal  MS: no gross musculoskeletal defects noted, no edema  SKIN: no suspicious lesions or rashes  NEURO: Normal strength and tone, mentation intact and speech normal  PSYCH: mentation appears normal, affect normal/bright    Diagnostic Test Results:  Labs reviewed in Epic    ASSESSMENT/PLAN:   Toney was seen today for physical.    Diagnoses and all orders for this visit:    Routine general medical examination at a health care facility    Check laboratory testing    -     PSA, screen; Future    -     Hepatitis C RNA, Quantitative by PCR with Confirmatory Reflex to Genotyping; Future  -     PSA, screen  -     Hepatitis C RNA, Quantitative by PCR with Confirmatory Reflex to Genotyping      Mixed hyperlipidemia    Continue atorvastatin  Check laboratory testing    -     Lipid panel reflex to direct LDL Fasting; Future  -     atorvastatin (LIPITOR) 80 MG tablet; [ATORVASTATIN (LIPITOR) 80 MG TABLET] TAKE 1 TABLET(80 MG) BY MOUTH AT BEDTIME  -     Basic metabolic panel; Future  -     Hepatic function panel; Future  -     Lipid panel reflex to direct LDL Fasting  -     Basic metabolic panel  -     Hepatic function panel    Adenomatous polyp of ascending colon    His colonoscopy is up-to-date    Impaired fasting glucose    Recommend limiting carbohydrates in his diet  Remain physically active    Atherosclerosis of coronary artery bypass graft of native heart without angina pectoris  S/P CABG x 5    Check laboratory testing  Continue aspirin, atorvastatin, and metoprolol  Follow-up with Dr. Ta, cardiology    -     CBC with platelets; Future  -     CBC with platelets    Essential hypertension with goal blood pressure less than 140/90    Blood pressure is elevated today  Recommend working on dietary and lifestyle changes    -     metoprolol succinate ER (TOPROL XL) 25 MG 24 hr tablet;  "Take 1 tablet (25 mg) by mouth daily    Rash, hands  Eczema vs. Psoriasis    He may use topical steroids  Continue is doingewed prescribed steroids with step down to over the counter hydrocortisone  Recommend moisturizing creams    Stye, left eye    Warm compresses  Follow-up with ophthalmology if needed     Other orders  -     TDAP VACCINE (Adacel, Boostrix)        Patient has been advised of split billing requirements and indicates understanding: Yes    COUNSELING:   Reviewed preventive health counseling, as reflected in patient instructions       Regular exercise       Healthy diet/nutrition       Alcohol Use        Colorectal cancer screening       Prostate cancer screening    Estimated body mass index is 28.51 kg/m  as calculated from the following:    Height as of this encounter: 1.775 m (5' 9.88\").    Weight as of this encounter: 89.8 kg (198 lb).     Weight management plan: Discussed healthy diet and exercise guidelines    He reports that he has never smoked. He has never used smokeless tobacco.      Counseling Resources:  ATP IV Guidelines  Pooled Cohorts Equation Calculator  FRAX Risk Assessment  ICSI Preventive Guidelines  Dietary Guidelines for Americans, 2010  USDA's MyPlate  ASA Prophylaxis  Lung CA Screening    Marshall Khalil MD  [facility]  "

## 2022-10-06 LAB — HCV RNA SERPL NAA+PROBE-ACNC: NOT DETECTED IU/ML

## 2022-11-22 DIAGNOSIS — I10 ESSENTIAL HYPERTENSION WITH GOAL BLOOD PRESSURE LESS THAN 140/90: ICD-10-CM

## 2022-11-23 RX ORDER — METOPROLOL SUCCINATE 25 MG/1
TABLET, EXTENDED RELEASE ORAL
Qty: 90 TABLET | Refills: 3 | Status: SHIPPED | OUTPATIENT
Start: 2022-11-23 | End: 2023-08-29

## 2023-08-27 DIAGNOSIS — I10 ESSENTIAL HYPERTENSION WITH GOAL BLOOD PRESSURE LESS THAN 140/90: ICD-10-CM

## 2023-08-27 NOTE — TELEPHONE ENCOUNTER
"Routing refill request to provider for review/approval because:  Blood pressure out of range and too early for refill    Last Written Prescription Date:  11/23/2022  Last Fill Quantity: 90,  # refills: 3   Last office visit provider:  10/4/2022     Requested Prescriptions   Pending Prescriptions Disp Refills    metoprolol succinate ER (TOPROL XL) 25 MG 24 hr tablet [Pharmacy Med Name: METOPROLOL ER SUCCINATE 25MG TABS] 90 tablet 3     Sig: TAKE 1 TABLET(25 MG) BY MOUTH DAILY       Beta-Blockers Protocol Failed - 8/27/2023 10:46 AM        Failed - Blood pressure under 140/90 in past 12 months     BP Readings from Last 3 Encounters:   10/04/22 (!) 149/95   06/22/21 129/82   09/16/20 (!) 140/86                 Passed - Patient is age 6 or older        Passed - Recent (12 mo) or future (30 days) visit within the authorizing provider's specialty     Patient has had an office visit with the authorizing provider or a provider within the authorizing providers department within the previous 12 mos or has a future within next 30 days. See \"Patient Info\" tab in inbasket, or \"Choose Columns\" in Meds & Orders section of the refill encounter.              Passed - Medication is active on med list             Pamela Keyes RN 08/27/23 10:47 AM  "

## 2023-08-29 RX ORDER — METOPROLOL SUCCINATE 25 MG/1
TABLET, EXTENDED RELEASE ORAL
Qty: 90 TABLET | Refills: 1 | Status: SHIPPED | OUTPATIENT
Start: 2023-08-29 | End: 2024-03-07

## 2023-11-21 DIAGNOSIS — E78.2 MIXED HYPERLIPIDEMIA: ICD-10-CM

## 2023-11-24 RX ORDER — ATORVASTATIN CALCIUM 80 MG/1
TABLET, FILM COATED ORAL
Qty: 90 TABLET | Refills: 0 | Status: SHIPPED | OUTPATIENT
Start: 2023-11-24 | End: 2024-02-27

## 2023-12-03 DIAGNOSIS — E78.2 MIXED HYPERLIPIDEMIA: ICD-10-CM

## 2023-12-04 RX ORDER — ATORVASTATIN CALCIUM 80 MG/1
TABLET, FILM COATED ORAL
Qty: 90 TABLET | Refills: 0 | OUTPATIENT
Start: 2023-12-04

## 2023-12-09 ENCOUNTER — HEALTH MAINTENANCE LETTER (OUTPATIENT)
Age: 58
End: 2023-12-09

## 2024-02-26 DIAGNOSIS — E78.2 MIXED HYPERLIPIDEMIA: ICD-10-CM

## 2024-02-27 ENCOUNTER — MYC MEDICAL ADVICE (OUTPATIENT)
Dept: FAMILY MEDICINE | Facility: CLINIC | Age: 59
End: 2024-02-27
Payer: COMMERCIAL

## 2024-02-27 DIAGNOSIS — E78.2 MIXED HYPERLIPIDEMIA: ICD-10-CM

## 2024-02-27 NOTE — TELEPHONE ENCOUNTER
Please call patient.  There is a refill request for atorvastatin but he has not been seen since October 2022.  He is due for a visit and no appointments are scheduled.  He is due for a visit with me and will need laboratory testing as well.  I would like to make sure he has this in place before refill is sent.

## 2024-02-27 NOTE — TELEPHONE ENCOUNTER
First Attempt: I sent a my chart message to the patient to please schedule a physical or med check appt with fasting lab work. Once appt is scheduled please route message back to Dr Khalil's Care team pool.

## 2024-02-29 RX ORDER — ATORVASTATIN CALCIUM 80 MG/1
TABLET, FILM COATED ORAL
Qty: 90 TABLET | Refills: 0 | Status: SHIPPED | OUTPATIENT
Start: 2024-02-29 | End: 2024-07-02

## 2024-03-03 DIAGNOSIS — I10 ESSENTIAL HYPERTENSION WITH GOAL BLOOD PRESSURE LESS THAN 140/90: ICD-10-CM

## 2024-03-06 RX ORDER — ATORVASTATIN CALCIUM 80 MG/1
TABLET, FILM COATED ORAL
Qty: 90 TABLET | Refills: 0 | Status: SHIPPED | OUTPATIENT
Start: 2024-03-06 | End: 2024-05-13

## 2024-03-07 RX ORDER — METOPROLOL SUCCINATE 25 MG/1
TABLET, EXTENDED RELEASE ORAL
Qty: 90 TABLET | Refills: 0 | Status: SHIPPED | OUTPATIENT
Start: 2024-03-07 | End: 2024-05-13

## 2024-05-13 ENCOUNTER — OFFICE VISIT (OUTPATIENT)
Dept: FAMILY MEDICINE | Facility: CLINIC | Age: 59
End: 2024-05-13
Payer: COMMERCIAL

## 2024-05-13 VITALS
HEIGHT: 70 IN | OXYGEN SATURATION: 95 % | BODY MASS INDEX: 28.69 KG/M2 | HEART RATE: 46 BPM | RESPIRATION RATE: 16 BRPM | SYSTOLIC BLOOD PRESSURE: 138 MMHG | DIASTOLIC BLOOD PRESSURE: 84 MMHG | WEIGHT: 200.4 LBS | TEMPERATURE: 97.9 F

## 2024-05-13 DIAGNOSIS — Z12.5 SCREENING FOR PROSTATE CANCER: ICD-10-CM

## 2024-05-13 DIAGNOSIS — I25.810 CORONARY ARTERY DISEASE INVOLVING CORONARY BYPASS GRAFT OF NATIVE HEART WITHOUT ANGINA PECTORIS: ICD-10-CM

## 2024-05-13 DIAGNOSIS — E78.2 MIXED HYPERLIPIDEMIA: ICD-10-CM

## 2024-05-13 DIAGNOSIS — I10 ESSENTIAL HYPERTENSION: Primary | ICD-10-CM

## 2024-05-13 DIAGNOSIS — D12.2 ADENOMATOUS POLYP OF ASCENDING COLON: ICD-10-CM

## 2024-05-13 LAB
ALBUMIN SERPL BCG-MCNC: 4.7 G/DL (ref 3.5–5.2)
ALP SERPL-CCNC: 45 U/L (ref 40–150)
ALT SERPL W P-5'-P-CCNC: 39 U/L (ref 0–70)
ANION GAP SERPL CALCULATED.3IONS-SCNC: 9 MMOL/L (ref 7–15)
AST SERPL W P-5'-P-CCNC: 43 U/L (ref 0–45)
BILIRUB DIRECT SERPL-MCNC: 0.26 MG/DL (ref 0–0.3)
BILIRUB SERPL-MCNC: 0.7 MG/DL
BUN SERPL-MCNC: 10 MG/DL (ref 8–23)
CALCIUM SERPL-MCNC: 9.5 MG/DL (ref 8.8–10.2)
CHLORIDE SERPL-SCNC: 103 MMOL/L (ref 98–107)
CHOLEST SERPL-MCNC: 127 MG/DL
CREAT SERPL-MCNC: 0.68 MG/DL (ref 0.67–1.17)
DEPRECATED HCO3 PLAS-SCNC: 28 MMOL/L (ref 22–29)
EGFRCR SERPLBLD CKD-EPI 2021: >90 ML/MIN/1.73M2
ERYTHROCYTE [DISTWIDTH] IN BLOOD BY AUTOMATED COUNT: 12.7 % (ref 10–15)
FASTING STATUS PATIENT QL REPORTED: YES
FASTING STATUS PATIENT QL REPORTED: YES
GLUCOSE SERPL-MCNC: 125 MG/DL (ref 70–99)
HCT VFR BLD AUTO: 43.9 % (ref 40–53)
HDLC SERPL-MCNC: 54 MG/DL
HGB BLD-MCNC: 14.8 G/DL (ref 13.3–17.7)
LDLC SERPL CALC-MCNC: 63 MG/DL
MCH RBC QN AUTO: 29.9 PG (ref 26.5–33)
MCHC RBC AUTO-ENTMCNC: 33.7 G/DL (ref 31.5–36.5)
MCV RBC AUTO: 89 FL (ref 78–100)
NONHDLC SERPL-MCNC: 73 MG/DL
PLATELET # BLD AUTO: 149 10E3/UL (ref 150–450)
POTASSIUM SERPL-SCNC: 4.4 MMOL/L (ref 3.4–5.3)
PROT SERPL-MCNC: 7.3 G/DL (ref 6.4–8.3)
PSA SERPL DL<=0.01 NG/ML-MCNC: 0.82 NG/ML (ref 0–4.5)
RBC # BLD AUTO: 4.95 10E6/UL (ref 4.4–5.9)
SODIUM SERPL-SCNC: 140 MMOL/L (ref 135–145)
TRIGL SERPL-MCNC: 49 MG/DL
WBC # BLD AUTO: 4.2 10E3/UL (ref 4–11)

## 2024-05-13 PROCEDURE — 80061 LIPID PANEL: CPT | Performed by: FAMILY MEDICINE

## 2024-05-13 PROCEDURE — 80053 COMPREHEN METABOLIC PANEL: CPT | Performed by: FAMILY MEDICINE

## 2024-05-13 PROCEDURE — 99214 OFFICE O/P EST MOD 30 MIN: CPT | Performed by: FAMILY MEDICINE

## 2024-05-13 PROCEDURE — 85027 COMPLETE CBC AUTOMATED: CPT | Performed by: FAMILY MEDICINE

## 2024-05-13 PROCEDURE — 36415 COLL VENOUS BLD VENIPUNCTURE: CPT | Performed by: FAMILY MEDICINE

## 2024-05-13 PROCEDURE — 82248 BILIRUBIN DIRECT: CPT | Performed by: FAMILY MEDICINE

## 2024-05-13 PROCEDURE — G2211 COMPLEX E/M VISIT ADD ON: HCPCS | Performed by: FAMILY MEDICINE

## 2024-05-13 PROCEDURE — G0103 PSA SCREENING: HCPCS | Performed by: FAMILY MEDICINE

## 2024-05-13 RX ORDER — METOPROLOL SUCCINATE 25 MG/1
25 TABLET, EXTENDED RELEASE ORAL DAILY
Qty: 90 TABLET | Refills: 3 | Status: SHIPPED | OUTPATIENT
Start: 2024-05-13 | End: 2024-07-02

## 2024-05-13 RX ORDER — NITROGLYCERIN 0.4 MG/1
TABLET SUBLINGUAL
Qty: 25 TABLET | Refills: 1 | Status: SHIPPED | OUTPATIENT
Start: 2024-05-13

## 2024-05-13 RX ORDER — ATORVASTATIN CALCIUM 80 MG/1
TABLET, FILM COATED ORAL
Qty: 90 TABLET | Refills: 3 | Status: SHIPPED | OUTPATIENT
Start: 2024-05-13

## 2024-05-13 NOTE — PATIENT INSTRUCTIONS
Toney,    We will check your laboratory testing as we discussed  Refills were sent to your pharmacy  We will check blood testing including blood counts, kidney profile, liver profile, cholesterol and a prostate test  You can consider the Shingrix/shingles vaccine.  You may get that at your local pharmacy or set up a nurse visit here  I entered a referral for you to see Dr. Ta, cardiology    Marshall Khalil MD

## 2024-05-13 NOTE — PROGRESS NOTES
"  Assessment & Plan     Essential hypertension    Currently stable  Continue metoprolol  Check laboratory testing  Monitor heart rate given bradycardia  He denies any symptoms of concern    - metoprolol succinate ER (TOPROL XL) 25 MG 24 hr tablet; Take 1 tablet (25 mg) by mouth daily    Coronary artery disease involving coronary bypass graft of native heart without angina pectoris  S/P CABG x 5    Currently stable    Check laboratory testing as noted  Continue current medications  Recommend follow-up with cardiology as he has not had a recent evaluation    - nitroGLYcerin (NITROSTAT) 0.4 MG sublingual tablet; For chest pain place 1 tablet under the tongue every 5 minutes for 3 doses. If symptoms persist 5 minutes after 1st dose call 911.  - Lipid panel reflex to direct LDL Non-fasting; Future  - CBC with platelets; Future  - Basic metabolic panel; Future  - Adult Cardiology Eval  Referral; Future  - Lipid panel reflex to direct LDL Non-fasting  - CBC with platelets  - Basic metabolic panel    Mixed hyperlipidemia    Continue atorvastatin  Continue current medication    - atorvastatin (LIPITOR) 80 MG tablet; TAKE 1 TABLET BY MOUTH EVERY NIGHT AT BEDTIME  - Hepatic function panel; Future  - Hepatic function panel    Screening for prostate cancer    Check a PSA    - PSA, screen; Future  - PSA, screen    History of adenomatous polyps    His colonoscopy was in September of 2022  He is due in 2025    The longitudinal plan of care for the diagnosis(es)/condition(s) as documented were addressed during this visit. Due to the added complexity in care, I will continue to support Toney in the subsequent management and with ongoing continuity of care.    Coronary artery disease  Essential hypertension  Hyperlipidemia            BMI  Estimated body mass index is 28.85 kg/m  as calculated from the following:    Height as of this encounter: 1.775 m (5' 9.88\").    Weight as of this encounter: 90.9 kg (200 lb 6.4 oz). " "            Subjective   Toney is a 58 year old, presenting for the following health issues:  Recheck Medication        5/13/2024     8:15 AM   Additional Questions   Roomed by Almita TATUM CMA     History of Present Illness       Reason for visit:  I need this visit so my doctor continues to renew my prescriptions.    He eats 2-3 servings of fruits and vegetables daily.He consumes 0 sweetened beverage(s) daily.He exercises with enough effort to increase his heart rate 60 or more minutes per day.  He exercises with enough effort to increase his heart rate 6 days per week.   He is taking medications regularly.     Toney presents to the clinic for a medication check. He was last seen in 2022.     Since his last check he has been doing well. He denies chest pain or shortness of breath. He tolerates physical activity without difficulty.     He has not had significant health changes since his last visit.     As noted previously, his medical history is notable for coronary artery disease, hypertension, and hyperlipidemia.  He had a 5 vessel bypass surgery in 2012 and previously had follow-up with Dr. Ta, cardiology.  He has been stable from a cardiovascular perspective.  He continues to take the maximum dose of atorvastatin.  His last LDL was 54.      His his last echocardiogram was in 2017 and revealed a normal ejection fraction of 69%.     His previous colonoscopy was in September 2022.  He reportedly had 3 polyps and will have a recheck in 2025.           Objective    BP (!) 140/78 (BP Location: Left arm, Patient Position: Sitting, Cuff Size: Adult Regular)   Pulse (!) 46   Temp 97.9  F (36.6  C) (Oral)   Resp 16   Ht 1.775 m (5' 9.88\")   Wt 90.9 kg (200 lb 6.4 oz)   SpO2 95%   BMI 28.85 kg/m    Body mass index is 28.85 kg/m .  Physical Exam   GENERAL: alert and no distress  EYES: Eyes grossly normal to inspection, PERRL and conjunctivae and sclerae normal  RESP: lungs clear to auscultation - no rales, rhonchi or " wheezes  CV: regular rate and rhythm, normal S1 S2, no S3 or S4, no murmur, click or rub, no peripheral edema  PSYCH: mentation appears normal, affect normal/bright            Signed Electronically by: Marshall Khalil MD

## 2024-05-19 PROBLEM — I25.810 CORONARY ARTERY DISEASE INVOLVING CORONARY BYPASS GRAFT OF NATIVE HEART WITHOUT ANGINA PECTORIS: Status: ACTIVE | Noted: 2024-05-19

## 2024-07-02 ENCOUNTER — OFFICE VISIT (OUTPATIENT)
Dept: CARDIOLOGY | Facility: CLINIC | Age: 59
End: 2024-07-02
Attending: FAMILY MEDICINE
Payer: COMMERCIAL

## 2024-07-02 VITALS
RESPIRATION RATE: 18 BRPM | SYSTOLIC BLOOD PRESSURE: 152 MMHG | BODY MASS INDEX: 28.8 KG/M2 | OXYGEN SATURATION: 97 % | HEART RATE: 48 BPM | DIASTOLIC BLOOD PRESSURE: 88 MMHG | WEIGHT: 200 LBS

## 2024-07-02 DIAGNOSIS — I25.810 CORONARY ARTERY DISEASE INVOLVING CORONARY BYPASS GRAFT OF NATIVE HEART WITHOUT ANGINA PECTORIS: Primary | ICD-10-CM

## 2024-07-02 DIAGNOSIS — E78.2 MIXED HYPERLIPIDEMIA: ICD-10-CM

## 2024-07-02 DIAGNOSIS — R00.1 SINUS BRADYCARDIA: ICD-10-CM

## 2024-07-02 DIAGNOSIS — I10 PRIMARY HYPERTENSION: ICD-10-CM

## 2024-07-02 PROCEDURE — 99204 OFFICE O/P NEW MOD 45 MIN: CPT | Performed by: INTERNAL MEDICINE

## 2024-07-02 RX ORDER — METOPROLOL SUCCINATE 25 MG/1
12.5 TABLET, EXTENDED RELEASE ORAL DAILY
Qty: 45 TABLET | Refills: 3 | Status: SHIPPED | OUTPATIENT
Start: 2024-07-02

## 2024-07-02 RX ORDER — LOSARTAN POTASSIUM 25 MG/1
25 TABLET ORAL DAILY
Qty: 30 TABLET | Refills: 11 | Status: SHIPPED | OUTPATIENT
Start: 2024-07-02

## 2024-07-02 NOTE — LETTER
7/2/2024    Marshall Khalil MD  [address]  [facility] [address]    RE: Toney Bro       Dear Colleague,     I had the pleasure of seeing Toney Bro in the [facility].      Click to link to [facility] HEART CARE NOTE    Thank you, Dr. Khalil, for asking me to see Toney Bro in consultation at [facility] to reestablish cardiology care.      Assessment/Plan:   Coronary artery disease s/p 5V CABG LIMA to LAD, SVG to ramus, D1, OM1, and right PDA in 2012: The patient did not have heart evaluation since 2017.  Clinically is doing fine, no chest pain, shortness of breath.  We discussed the evaluation and management.  After discussion, exercise stress echo is requested for evaluation of for myocardial ischemia, heart function and structure.  Continue aspirin, atorvastatin and metoprolol.  Sinus of bradycardia: His heart rate has been low.  Metoprolol succinate 25 mg daily is reduced to 12.5 mg daily.  Benign essential hypertension: His blood pressure is not well-controlled.  Metoprolol succinate 25 mg daily is reduced to 12.5 mg daily as discussed above due to sinus bradycardia.  Start losartan 25 mg daily for better blood pressure control.  Continue to monitor blood pressure at the daily basis.  The target of his blood pressure is better less than 130/80 mmHg.  Dyslipidemia: Continue atorvastatin 80 mg at bedtime.  His recent laboratory test on May 13, 2024 including CBC, BMP, hepatic function and lipid profile are reviewed.  LDL was well-controlled.    We will follow-up with his exercise stress echo report and discuss the findings with the patient.    Thank you for the opportunity to be involved in the care of Toney Bro. If you have any questions, please feel free to contact me.  I will see the patient again in 12 months and as needed    Much or all of the text in this note was generated through the use of Dragon  Dictate voice-to-text software. Errors in spelling or words which seem out of context are unintentional.   Sound alike errors, in particular, may have escaped editing.       History of Present Illness:   It is my pleasure to see Toney Bro at the [facility] clinic for since of New Patient. Toney Bro is a 58 year old male with a medical history of coronary artery disease, status post 5-vessel CABG LIMA to LAD, SVG to ramus, D1, OM1, and right PDA in 12/2012, essential hypertension and hyperlipidemia.     The patient did not have cardiology follow-up since September 2020.    He presents to cardiology clinic today for reestablishing cardiology care.  He denies chest pain, shortness of breath on exertion, palpitations, dizziness, orthopnea, PND or leg edema.  His blood pressure has been high, 172/88 mmHg this morning, mild bradycardia 47 bpm.  Currently he is on metoprolol succinate 25 mg daily.  His LDL was controlled.    Past Medical History:     Patient Active Problem List   Diagnosis    Insomnia    Psoriasis    Midback Pain    Coronary Artery Disease    Hypertension    Closed Fracture Of The Right Ankle    Mixed hyperlipidemia    Impaired Fasting Glucose    Adenomatous polyp of ascending colon    Coronary artery disease involving coronary bypass graft of native heart without angina pectoris       Past Surgical History:     Past Surgical History:   Procedure Laterality Date    C UNLISTED PROCEDURE, ABDOMEN/PERITONEUM/OMENTUM      Description: Hernia Repair;  Recorded: 04/03/2008;       Family History:     Family History   Problem Relation Age of Onset    Hypertension Mother     Hypertension Father     Heart Disease Father        Social History:    reports that he has never smoked. He has never used smokeless tobacco.    Review of Systems:   12 systems are reviewed negative except for in HPI.    Meds:     Current Outpatient Medications:     ASCORBATE CALCIUM (VITAMIN C ORAL), [ASCORBATE  CALCIUM (VITAMIN C ORAL)] Take by mouth. 500mg daily, Disp: , Rfl:     aspirin 81 MG EC tablet, [ASPIRIN 81 MG EC TABLET] Take 81 mg by mouth daily., Disp: , Rfl:     atorvastatin (LIPITOR) 80 MG tablet, TAKE 1 TABLET BY MOUTH EVERY NIGHT AT BEDTIME, Disp: 90 tablet, Rfl: 3    cholecalciferol, vitamin D3, (VITAMIN D3) 1,000 unit capsule, [CHOLECALCIFEROL, VITAMIN D3, (VITAMIN D3) 1,000 UNIT CAPSULE] Take 500 Units by mouth daily. , Disp: , Rfl:     IBUPROFEN ORAL, [IBUPROFEN ORAL] Take by mouth as needed. , Disp: , Rfl:     losartan (COZAAR) 25 MG tablet, Take 1 tablet (25 mg) by mouth daily, Disp: 30 tablet, Rfl: 11    metoprolol succinate ER (TOPROL XL) 25 MG 24 hr tablet, Take 0.5 tablets (12.5 mg) by mouth daily, Disp: 45 tablet, Rfl: 3    MULTIVIT &MINERALS/FERROUS FUM (MULTI VITAMIN ORAL), [MULTIVIT &MINERALS/FERROUS FUM (MULTI VITAMIN ORAL)] Take by mouth., Disp: , Rfl:     nitroGLYcerin (NITROSTAT) 0.4 MG sublingual tablet, For chest pain place 1 tablet under the tongue every 5 minutes for 3 doses. If symptoms persist 5 minutes after 1st dose call 911., Disp: 25 tablet, Rfl: 1     Allergies:   Lisinopril    Objective:      Physical Exam  90.7 kg (200 lb)     Body mass index is 28.8 kg/m .  BP (!) 152/88 (BP Location: Left arm, Patient Position: Sitting, Cuff Size: Adult Large)   Pulse (!) 48   Resp 18   Wt 90.7 kg (200 lb)   SpO2 97%   BMI 28.80 kg/m      General Appearance:   Awake, Alert, No acute distress.   HEENT:  Pupil equal, reactive to light. No scleral icterus; the mucous membranes were moist. No oral ulcers or thrush.    Neck: No cervical bruits. No JVD. No thyromegaly. No lymph node enlargement or tenderness.   Chest: The spine was straight. The chest was symmetric.   Lungs:   Respirations unlabored. Lungs are clear to auscultation. No crackles. No wheezing.   Cardiovascular:   RRR, normal first and second heart sounds with no murmurs. No rubs or gallops.    Abdomen:  Soft. No tenderness.  Non-distended. Bowels sounds are present   Extremities: Equal posterior tibial pulses. No leg edema.   Skin: No rashes or ulcers. Warm, Dry.   Musculoskeletal: No tenderness. No deformity.   Neurologic: Mood and affect are appropriate. No focal deficits.         Lab Review   Lab Results   Component Value Date     05/13/2024    CO2 28 05/13/2024    CO2 29 09/09/2021    BUN 10.0 05/13/2024    BUN 9 09/09/2021     Lab Results   Component Value Date    WBC 4.2 05/13/2024    HGB 14.8 05/13/2024    HCT 43.9 05/13/2024    MCV 89 05/13/2024     05/13/2024     Lab Results   Component Value Date    CHOL 127 05/13/2024    TRIG 49 05/13/2024    HDL 54 05/13/2024    LDL 63 05/13/2024     LDL Cholesterol Calculated   Date Value Ref Range Status   05/13/2024 63 <=100 mg/dL Final                 Thank you for allowing me to participate in the care of your patient.      Sincerely,     Salo Ta MD     [facility]  cc:   Marshall Khalil MD  [address] [place] [address]

## 2024-07-02 NOTE — PROGRESS NOTES
Click to link to [facility] HEART CARE NOTE    Thank you, Dr. Khalil, for asking me to see Toney Bro in consultation at [facility] to reestablish cardiology care.      Assessment/Plan:   Coronary artery disease s/p 5V CABG LIMA to LAD, SVG to ramus, D1, OM1, and right PDA in 2012: The patient did not have heart evaluation since 2017.  Clinically is doing fine, no chest pain, shortness of breath.  We discussed the evaluation and management.  After discussion, exercise stress echo is requested for evaluation of for myocardial ischemia, heart function and structure.  Continue aspirin, atorvastatin and metoprolol.  Sinus of bradycardia: His heart rate has been low.  Metoprolol succinate 25 mg daily is reduced to 12.5 mg daily.  Benign essential hypertension: His blood pressure is not well-controlled.  Metoprolol succinate 25 mg daily is reduced to 12.5 mg daily as discussed above due to sinus bradycardia.  Start losartan 25 mg daily for better blood pressure control.  Continue to monitor blood pressure at the daily basis.  The target of his blood pressure is better less than 130/80 mmHg.  Dyslipidemia: Continue atorvastatin 80 mg at bedtime.  His recent laboratory test on May 13, 2024 including CBC, BMP, hepatic function and lipid profile are reviewed.  LDL was well-controlled.    We will follow-up with his exercise stress echo report and discuss the findings with the patient.    Thank you for the opportunity to be involved in the care of Toney Bro. If you have any questions, please feel free to contact me.  I will see the patient again in 12 months and as needed    Much or all of the text in this note was generated through the use of Dragon Dictate voice-to-text software. Errors in spelling or words which seem out of context are unintentional.   Sound alike errors, in particular, may have escaped editing.       History of Present Illness:   It is  my pleasure to see Toney Bro at the [facility] clinic for since of New Patient. Toney Bro is a 58 year old male with a medical history of coronary artery disease, status post 5-vessel CABG LIMA to LAD, SVG to ramus, D1, OM1, and right PDA in 12/2012, essential hypertension and hyperlipidemia.     The patient did not have cardiology follow-up since September 2020.    He presents to cardiology clinic today for reestablishing cardiology care.  He denies chest pain, shortness of breath on exertion, palpitations, dizziness, orthopnea, PND or leg edema.  His blood pressure has been high, 172/88 mmHg this morning, mild bradycardia 47 bpm.  Currently he is on metoprolol succinate 25 mg daily.  His LDL was controlled.    Past Medical History:     Patient Active Problem List   Diagnosis    Insomnia    Psoriasis    Midback Pain    Coronary Artery Disease    Hypertension    Closed Fracture Of The Right Ankle    Mixed hyperlipidemia    Impaired Fasting Glucose    Adenomatous polyp of ascending colon    Coronary artery disease involving coronary bypass graft of native heart without angina pectoris       Past Surgical History:     Past Surgical History:   Procedure Laterality Date    C UNLISTED PROCEDURE, ABDOMEN/PERITONEUM/OMENTUM      Description: Hernia Repair;  Recorded: 04/03/2008;       Family History:     Family History   Problem Relation Age of Onset    Hypertension Mother     Hypertension Father     Heart Disease Father        Social History:    reports that he has never smoked. He has never used smokeless tobacco.    Review of Systems:   12 systems are reviewed negative except for in HPI.    Meds:     Current Outpatient Medications:     ASCORBATE CALCIUM (VITAMIN C ORAL), [ASCORBATE CALCIUM (VITAMIN C ORAL)] Take by mouth. 500mg daily, Disp: , Rfl:     aspirin 81 MG EC tablet, [ASPIRIN 81 MG EC TABLET] Take 81 mg by mouth daily., Disp: , Rfl:     atorvastatin (LIPITOR) 80 MG tablet, TAKE 1  TABLET BY MOUTH EVERY NIGHT AT BEDTIME, Disp: 90 tablet, Rfl: 3    cholecalciferol, vitamin D3, (VITAMIN D3) 1,000 unit capsule, [CHOLECALCIFEROL, VITAMIN D3, (VITAMIN D3) 1,000 UNIT CAPSULE] Take 500 Units by mouth daily. , Disp: , Rfl:     IBUPROFEN ORAL, [IBUPROFEN ORAL] Take by mouth as needed. , Disp: , Rfl:     losartan (COZAAR) 25 MG tablet, Take 1 tablet (25 mg) by mouth daily, Disp: 30 tablet, Rfl: 11    metoprolol succinate ER (TOPROL XL) 25 MG 24 hr tablet, Take 0.5 tablets (12.5 mg) by mouth daily, Disp: 45 tablet, Rfl: 3    MULTIVIT &MINERALS/FERROUS FUM (MULTI VITAMIN ORAL), [MULTIVIT &MINERALS/FERROUS FUM (MULTI VITAMIN ORAL)] Take by mouth., Disp: , Rfl:     nitroGLYcerin (NITROSTAT) 0.4 MG sublingual tablet, For chest pain place 1 tablet under the tongue every 5 minutes for 3 doses. If symptoms persist 5 minutes after 1st dose call 911., Disp: 25 tablet, Rfl: 1     Allergies:   Lisinopril    Objective:      Physical Exam  90.7 kg (200 lb)     Body mass index is 28.8 kg/m .  BP (!) 152/88 (BP Location: Left arm, Patient Position: Sitting, Cuff Size: Adult Large)   Pulse (!) 48   Resp 18   Wt 90.7 kg (200 lb)   SpO2 97%   BMI 28.80 kg/m      General Appearance:   Awake, Alert, No acute distress.   HEENT:  Pupil equal, reactive to light. No scleral icterus; the mucous membranes were moist. No oral ulcers or thrush.    Neck: No cervical bruits. No JVD. No thyromegaly. No lymph node enlargement or tenderness.   Chest: The spine was straight. The chest was symmetric.   Lungs:   Respirations unlabored. Lungs are clear to auscultation. No crackles. No wheezing.   Cardiovascular:   RRR, normal first and second heart sounds with no murmurs. No rubs or gallops.    Abdomen:  Soft. No tenderness. Non-distended. Bowels sounds are present   Extremities: Equal posterior tibial pulses. No leg edema.   Skin: No rashes or ulcers. Warm, Dry.   Musculoskeletal: No tenderness. No deformity.   Neurologic: Mood and  affect are appropriate. No focal deficits.         Lab Review   Lab Results   Component Value Date     05/13/2024    CO2 28 05/13/2024    CO2 29 09/09/2021    BUN 10.0 05/13/2024    BUN 9 09/09/2021     Lab Results   Component Value Date    WBC 4.2 05/13/2024    HGB 14.8 05/13/2024    HCT 43.9 05/13/2024    MCV 89 05/13/2024     05/13/2024     Lab Results   Component Value Date    CHOL 127 05/13/2024    TRIG 49 05/13/2024    HDL 54 05/13/2024    LDL 63 05/13/2024     LDL Cholesterol Calculated   Date Value Ref Range Status   05/13/2024 63 <=100 mg/dL Final

## 2025-01-11 ENCOUNTER — HEALTH MAINTENANCE LETTER (OUTPATIENT)
Age: 60
End: 2025-01-11

## 2025-05-22 DIAGNOSIS — E78.2 MIXED HYPERLIPIDEMIA: ICD-10-CM

## 2025-05-22 RX ORDER — ATORVASTATIN CALCIUM 80 MG/1
80 TABLET, FILM COATED ORAL AT BEDTIME
Qty: 90 TABLET | Refills: 0 | OUTPATIENT
Start: 2025-05-22

## 2025-06-03 ENCOUNTER — OFFICE VISIT (OUTPATIENT)
Dept: FAMILY MEDICINE | Facility: CLINIC | Age: 60
End: 2025-06-03
Payer: COMMERCIAL

## 2025-06-03 VITALS
WEIGHT: 202.2 LBS | OXYGEN SATURATION: 98 % | HEIGHT: 70 IN | HEART RATE: 57 BPM | SYSTOLIC BLOOD PRESSURE: 151 MMHG | DIASTOLIC BLOOD PRESSURE: 92 MMHG | BODY MASS INDEX: 28.95 KG/M2 | RESPIRATION RATE: 18 BRPM | TEMPERATURE: 98.2 F

## 2025-06-03 DIAGNOSIS — I10 ESSENTIAL HYPERTENSION: ICD-10-CM

## 2025-06-03 DIAGNOSIS — I25.810 CORONARY ARTERY DISEASE INVOLVING CORONARY BYPASS GRAFT OF NATIVE HEART WITHOUT ANGINA PECTORIS: ICD-10-CM

## 2025-06-03 DIAGNOSIS — R73.09 ELEVATED GLUCOSE: ICD-10-CM

## 2025-06-03 DIAGNOSIS — E78.2 MIXED HYPERLIPIDEMIA: ICD-10-CM

## 2025-06-03 DIAGNOSIS — H61.23 BILATERAL IMPACTED CERUMEN: ICD-10-CM

## 2025-06-03 DIAGNOSIS — I10 PRIMARY HYPERTENSION: Primary | ICD-10-CM

## 2025-06-03 DIAGNOSIS — Z12.5 SCREENING FOR PROSTATE CANCER: ICD-10-CM

## 2025-06-03 DIAGNOSIS — Z13.6 SCREENING FOR CARDIOVASCULAR CONDITION: ICD-10-CM

## 2025-06-03 LAB
ALBUMIN SERPL BCG-MCNC: 4.4 G/DL (ref 3.5–5.2)
ALP SERPL-CCNC: 41 U/L (ref 40–150)
ALT SERPL W P-5'-P-CCNC: 24 U/L (ref 0–70)
ANION GAP SERPL CALCULATED.3IONS-SCNC: 10 MMOL/L (ref 7–15)
AST SERPL W P-5'-P-CCNC: 31 U/L (ref 0–45)
BILIRUB SERPL-MCNC: 0.6 MG/DL
BILIRUBIN DIRECT (ROCHE PRO & PURE): 0.29 MG/DL (ref 0–0.45)
BUN SERPL-MCNC: 9.3 MG/DL (ref 8–23)
CALCIUM SERPL-MCNC: 9.5 MG/DL (ref 8.8–10.4)
CHLORIDE SERPL-SCNC: 102 MMOL/L (ref 98–107)
CHOLEST SERPL-MCNC: 131 MG/DL
CREAT SERPL-MCNC: 0.68 MG/DL (ref 0.67–1.17)
EGFRCR SERPLBLD CKD-EPI 2021: >90 ML/MIN/1.73M2
ERYTHROCYTE [DISTWIDTH] IN BLOOD BY AUTOMATED COUNT: 12.2 % (ref 10–15)
EST. AVERAGE GLUCOSE BLD GHB EST-MCNC: 114 MG/DL
FASTING STATUS PATIENT QL REPORTED: YES
FASTING STATUS PATIENT QL REPORTED: YES
GLUCOSE SERPL-MCNC: 111 MG/DL (ref 70–99)
HBA1C MFR BLD: 5.6 % (ref 0–5.6)
HCO3 SERPL-SCNC: 27 MMOL/L (ref 22–29)
HCT VFR BLD AUTO: 42.6 % (ref 40–53)
HDLC SERPL-MCNC: 58 MG/DL
HGB BLD-MCNC: 14.5 G/DL (ref 13.3–17.7)
LDLC SERPL CALC-MCNC: 56 MG/DL
MCH RBC QN AUTO: 30.1 PG (ref 26.5–33)
MCHC RBC AUTO-ENTMCNC: 34 G/DL (ref 31.5–36.5)
MCV RBC AUTO: 88 FL (ref 78–100)
NONHDLC SERPL-MCNC: 73 MG/DL
PLATELET # BLD AUTO: 150 10E3/UL (ref 150–450)
POTASSIUM SERPL-SCNC: 4.1 MMOL/L (ref 3.4–5.3)
PROT SERPL-MCNC: 7 G/DL (ref 6.4–8.3)
PSA SERPL DL<=0.01 NG/ML-MCNC: 1.05 NG/ML (ref 0–4.5)
RBC # BLD AUTO: 4.82 10E6/UL (ref 4.4–5.9)
SODIUM SERPL-SCNC: 139 MMOL/L (ref 135–145)
TRIGL SERPL-MCNC: 86 MG/DL
WBC # BLD AUTO: 3.8 10E3/UL (ref 4–11)

## 2025-06-03 PROCEDURE — 82248 BILIRUBIN DIRECT: CPT | Performed by: FAMILY MEDICINE

## 2025-06-03 PROCEDURE — 3077F SYST BP >= 140 MM HG: CPT | Performed by: FAMILY MEDICINE

## 2025-06-03 PROCEDURE — 36415 COLL VENOUS BLD VENIPUNCTURE: CPT | Performed by: FAMILY MEDICINE

## 2025-06-03 PROCEDURE — 99214 OFFICE O/P EST MOD 30 MIN: CPT | Mod: 25 | Performed by: FAMILY MEDICINE

## 2025-06-03 PROCEDURE — 3048F LDL-C <100 MG/DL: CPT | Performed by: FAMILY MEDICINE

## 2025-06-03 PROCEDURE — 69209 REMOVE IMPACTED EAR WAX UNI: CPT | Mod: 50 | Performed by: FAMILY MEDICINE

## 2025-06-03 PROCEDURE — 80053 COMPREHEN METABOLIC PANEL: CPT | Performed by: FAMILY MEDICINE

## 2025-06-03 PROCEDURE — 80061 LIPID PANEL: CPT | Performed by: FAMILY MEDICINE

## 2025-06-03 PROCEDURE — 3080F DIAST BP >= 90 MM HG: CPT | Performed by: FAMILY MEDICINE

## 2025-06-03 PROCEDURE — 85027 COMPLETE CBC AUTOMATED: CPT | Performed by: FAMILY MEDICINE

## 2025-06-03 PROCEDURE — 3044F HG A1C LEVEL LT 7.0%: CPT | Performed by: FAMILY MEDICINE

## 2025-06-03 PROCEDURE — 83036 HEMOGLOBIN GLYCOSYLATED A1C: CPT | Performed by: FAMILY MEDICINE

## 2025-06-03 PROCEDURE — G0103 PSA SCREENING: HCPCS | Performed by: FAMILY MEDICINE

## 2025-06-03 RX ORDER — LOSARTAN POTASSIUM 50 MG/1
50 TABLET ORAL DAILY
Qty: 90 TABLET | Refills: 3 | Status: SHIPPED | OUTPATIENT
Start: 2025-06-03

## 2025-06-03 NOTE — PATIENT INSTRUCTIONS
Toney,    As we discussed you can increase losartan to 50 mg daily  Continue to monitor your blood pressure  Please forward some updates in 1-2 weeks  Follow-up with Dr. Ta as planned  We will check her laboratory testing  We reviewed vaccines today including the pneumococcal 20 vaccine, shingles/Shingrix vaccine, and also the RSV vaccine  Increase your physical activity as you are able and continue to work on weight loss.  Try to limit carbohydrates in your diet  Your ears were flushed today    Marshall Khalil MD

## 2025-06-03 NOTE — PROGRESS NOTES
Assessment & Plan     Primary hypertension  ***  - BASIC METABOLIC PANEL; Future    Coronary artery disease involving coronary bypass graft of native heart without angina pectoris  ***  - CBC with platelets; Future    Screening for cardiovascular condition  ***    Essential hypertension  ***  - losartan (COZAAR) 50 MG tablet; Take 1 tablet (50 mg) by mouth daily.    Mixed hyperlipidemia  ***  - Hepatic function panel; Future  - Lipid panel reflex to direct LDL Fasting; Future    Elevated glucose  ***  - Hemoglobin A1c; Future    Screening for prostate cancer  ***  - PSA, screen; Future    Bilateral impacted cerumen  ***  - FL REMOVAL IMPACTED CERUMEN IRRIGATION/LVG UNILAT            {Follow-up (Optional):103972}    Chilo Ziegler is a 59 year old, presenting for the following health issues:  Recheck Medication        6/3/2025     9:03 AM   Additional Questions   Roomed by Almita TATUM CMA     History of Present Illness       Hypertension: He presents for follow up of hypertension.  He does check blood pressure  regularly outside of the clinic. Outside blood pressures have been over 140/90. He does not follow a low salt diet.     He eats 2-3 servings of fruits and vegetables daily.He consumes 0 sweetened beverage(s) daily.He exercises with enough effort to increase his heart rate 60 or more minutes per day.  He exercises with enough effort to increase his heart rate 6 days per week.   He is taking medications regularly.        {MA/LPN/RN Pre-Provider Visit Orders- hCG/UA/Strep (Optional):907521}  {SUPERLIST (Optional):860109}  {additonal problems for provider to add (Optional):984412}    {ROS Picklists (Optional):766559}      Objective    BP (!) 151/92 (BP Location: Left arm, Patient Position: Sitting, Cuff Size: Adult Large)   Pulse 57   Temp 98.2  F (36.8  C) (Oral)   Resp 18   Wt 91.7 kg (202 lb 3.2 oz)   SpO2 98%   BMI 29.11 kg/m    Body mass index is 29.11 kg/m .  Physical Exam   {Exam List  (Optional):197131}    {Diagnostic Test Results (Optional):110228}        Signed Electronically by: Marshall Khalil MD  {Email feedback regarding this note to [email or web address]   :151496}   normal  HENT: normal cephalic/atraumatic and both ears are filled with cerumen  RESP: lungs clear to auscultation - no rales, rhonchi or wheezes  CV: regular rate and rhythm, normal S1 S2, no S3 or S4, no murmur, click or rub, no peripheral edema  NEURO: Normal strength and tone, mentation intact and speech normal  PSYCH: mentation appears normal, affect normal/bright            Signed Electronically by: Marshall Khalil MD

## 2025-07-07 ENCOUNTER — PATIENT OUTREACH (OUTPATIENT)
Dept: CARE COORDINATION | Facility: CLINIC | Age: 60
End: 2025-07-07
Payer: COMMERCIAL

## 2025-07-08 ENCOUNTER — MYC MEDICAL ADVICE (OUTPATIENT)
Dept: CARDIOLOGY | Facility: CLINIC | Age: 60
End: 2025-07-08
Payer: COMMERCIAL

## 2025-07-15 NOTE — TELEPHONE ENCOUNTER
[place] Community Measures Blood Pressure guideline reviewed.  Patients recent blood pressure is outside of guideline parameters.      Called pt to review, no answer.  Left voicemail message asking patient to check their blood pressure using a home blood pressure cuff or by going to a [facility].  Also left a  message previously,     Patient instructed to then call [phone] ([facility]) and leave a message with their name, date of birth, and blood pressure reading that was completed within the last 24 hours and where it was completed.      Will await call back for further review. Thanks.    TRACEY Bustamante

## 2025-07-21 NOTE — TELEPHONE ENCOUNTER
Pt has not responded to [facility] message sent on 7/8/25 nor the VM left on 7/15/25.  Closing task due to no response.  Thanks    TRACEY Bustamante

## 2025-07-29 ENCOUNTER — HOSPITAL ENCOUNTER (OUTPATIENT)
Dept: BONE DENSITY | Facility: HOSPITAL | Age: 60
Discharge: HOME | End: 2025-07-29
Attending: FAMILY MEDICINE
Payer: COMMERCIAL

## 2025-07-29 PROCEDURE — 77080 DXA BONE DENSITY AXIAL: CPT

## 2025-08-16 ENCOUNTER — PATIENT OUTREACH (OUTPATIENT)
Dept: CARE COORDINATION | Facility: CLINIC | Age: 60
End: 2025-08-16
Payer: COMMERCIAL

## 2025-08-22 PROBLEM — M85.80 OSTEOPENIA: Status: ACTIVE | Noted: 2025-08-22

## 2025-09-03 DIAGNOSIS — I10 PRIMARY HYPERTENSION: ICD-10-CM

## 2025-09-03 RX ORDER — METOPROLOL SUCCINATE 25 MG/1
12.5 TABLET, EXTENDED RELEASE ORAL DAILY
Qty: 45 TABLET | Refills: 3 | Status: SHIPPED | OUTPATIENT
Start: 2025-09-03